# Patient Record
Sex: FEMALE | Race: WHITE | NOT HISPANIC OR LATINO | Employment: UNEMPLOYED | URBAN - METROPOLITAN AREA
[De-identification: names, ages, dates, MRNs, and addresses within clinical notes are randomized per-mention and may not be internally consistent; named-entity substitution may affect disease eponyms.]

---

## 2020-12-10 DIAGNOSIS — Z36.9 ENCOUNTER FOR FETAL ULTRASOUND: Primary | ICD-10-CM

## 2022-12-19 ENCOUNTER — LAB VISIT (OUTPATIENT)
Dept: LAB | Facility: CLINIC | Age: 22
End: 2022-12-19
Payer: OTHER GOVERNMENT

## 2022-12-19 ENCOUNTER — INITIAL PRENATAL (OUTPATIENT)
Dept: OBSTETRICS AND GYNECOLOGY | Facility: CLINIC | Age: 22
End: 2022-12-19
Payer: OTHER GOVERNMENT

## 2022-12-19 VITALS
HEIGHT: 62 IN | HEART RATE: 91 BPM | BODY MASS INDEX: 30.91 KG/M2 | SYSTOLIC BLOOD PRESSURE: 132 MMHG | WEIGHT: 168 LBS | DIASTOLIC BLOOD PRESSURE: 68 MMHG

## 2022-12-19 DIAGNOSIS — Z86.59 HISTORY OF DEPRESSION: ICD-10-CM

## 2022-12-19 DIAGNOSIS — Z3A.10 10 WEEKS GESTATION OF PREGNANCY: ICD-10-CM

## 2022-12-19 DIAGNOSIS — O30.049 DICHORIONIC DIAMNIOTIC TWIN PREGNANCY, ANTEPARTUM: Primary | ICD-10-CM

## 2022-12-19 DIAGNOSIS — O30.049 DICHORIONIC DIAMNIOTIC TWIN PREGNANCY, ANTEPARTUM: ICD-10-CM

## 2022-12-19 LAB
ABO + RH BLD: NORMAL
AMPHET+METHAMPHET UR QL: NEGATIVE
BARBITURATES UR QL SCN>200 NG/ML: NEGATIVE
BASOPHILS # BLD AUTO: 0.06 K/UL (ref 0–0.2)
BASOPHILS NFR BLD: 0.4 % (ref 0–1.9)
BENZODIAZ UR QL SCN>200 NG/ML: NEGATIVE
BLD GP AB SCN CELLS X3 SERPL QL: NORMAL
BZE UR QL SCN: NEGATIVE
CANNABINOIDS UR QL SCN: ABNORMAL
CREAT UR-MCNC: 110.2 MG/DL (ref 15–325)
DIFFERENTIAL METHOD: ABNORMAL
EOSINOPHIL # BLD AUTO: 0.1 K/UL (ref 0–0.5)
EOSINOPHIL NFR BLD: 0.5 % (ref 0–8)
ERYTHROCYTE [DISTWIDTH] IN BLOOD BY AUTOMATED COUNT: 12.1 % (ref 11.5–14.5)
HCT VFR BLD AUTO: 43.3 % (ref 37–48.5)
HGB BLD-MCNC: 14.4 G/DL (ref 12–16)
IMM GRANULOCYTES # BLD AUTO: 0.05 K/UL (ref 0–0.04)
IMM GRANULOCYTES NFR BLD AUTO: 0.4 % (ref 0–0.5)
LYMPHOCYTES # BLD AUTO: 4 K/UL (ref 1–4.8)
LYMPHOCYTES NFR BLD: 28.6 % (ref 18–48)
MCH RBC QN AUTO: 30 PG (ref 27–31)
MCHC RBC AUTO-ENTMCNC: 33.3 G/DL (ref 32–36)
MCV RBC AUTO: 90 FL (ref 82–98)
METHADONE UR QL SCN>300 NG/ML: NEGATIVE
MONOCYTES # BLD AUTO: 0.9 K/UL (ref 0.3–1)
MONOCYTES NFR BLD: 6.1 % (ref 4–15)
NEUTROPHILS # BLD AUTO: 8.9 K/UL (ref 1.8–7.7)
NEUTROPHILS NFR BLD: 64 % (ref 38–73)
NRBC BLD-RTO: 0 /100 WBC
OPIATES UR QL SCN: NEGATIVE
PCP UR QL SCN>25 NG/ML: NEGATIVE
PLATELET # BLD AUTO: 295 K/UL (ref 150–450)
PMV BLD AUTO: 11.9 FL (ref 9.2–12.9)
RBC # BLD AUTO: 4.8 M/UL (ref 4–5.4)
TOXICOLOGY INFORMATION: ABNORMAL
WBC # BLD AUTO: 13.95 K/UL (ref 3.9–12.7)

## 2022-12-19 PROCEDURE — 87491 CHLMYD TRACH DNA AMP PROBE: CPT

## 2022-12-19 PROCEDURE — 87591 N.GONORRHOEAE DNA AMP PROB: CPT

## 2022-12-19 PROCEDURE — 86901 BLOOD TYPING SEROLOGIC RH(D): CPT

## 2022-12-19 PROCEDURE — 36415 PR COLLECTION VENOUS BLOOD,VENIPUNCTURE: ICD-10-PCS | Mod: ,,, | Performed by: STUDENT IN AN ORGANIZED HEALTH CARE EDUCATION/TRAINING PROGRAM

## 2022-12-19 PROCEDURE — 86762 RUBELLA ANTIBODY: CPT

## 2022-12-19 PROCEDURE — 80074 ACUTE HEPATITIS PANEL: CPT

## 2022-12-19 PROCEDURE — 86592 SYPHILIS TEST NON-TREP QUAL: CPT

## 2022-12-19 PROCEDURE — 87389 HIV-1 AG W/HIV-1&-2 AB AG IA: CPT

## 2022-12-19 PROCEDURE — 85660 RBC SICKLE CELL TEST: CPT

## 2022-12-19 PROCEDURE — 80307 DRUG TEST PRSMV CHEM ANLYZR: CPT

## 2022-12-19 PROCEDURE — 87086 URINE CULTURE/COLONY COUNT: CPT

## 2022-12-19 PROCEDURE — 85025 COMPLETE CBC W/AUTO DIFF WBC: CPT

## 2022-12-19 PROCEDURE — 36415 COLL VENOUS BLD VENIPUNCTURE: CPT | Mod: ,,, | Performed by: STUDENT IN AN ORGANIZED HEALTH CARE EDUCATION/TRAINING PROGRAM

## 2022-12-19 RX ORDER — FOLIC ACID 1 MG/1
1 TABLET ORAL DAILY
Qty: 90 TABLET | Refills: 3 | Status: SHIPPED | OUTPATIENT
Start: 2022-12-19 | End: 2023-05-05

## 2022-12-19 RX ORDER — DOXYLAMINE SUCCINATE 25 MG/1
TABLET ORAL
COMMUNITY
Start: 2022-11-28 | End: 2023-01-04 | Stop reason: SDUPTHER

## 2022-12-19 RX ORDER — LANOLIN ALCOHOL/MO/W.PET/CERES
CREAM (GRAM) TOPICAL
COMMUNITY
Start: 2022-11-28 | End: 2023-01-04 | Stop reason: SDUPTHER

## 2022-12-19 RX ORDER — ASPIRIN 81 MG/1
81 TABLET ORAL DAILY
Qty: 90 TABLET | Refills: 3 | Status: SHIPPED | OUTPATIENT
Start: 2022-12-19 | End: 2023-05-05

## 2022-12-19 NOTE — PROGRESS NOTES
"2022  22 y.o. 10w2d per early first trimester dating u/s at [facility]. Estimated Date of Delivery: 7/15/23. Transfer from [facility]; records pending. Dichorionic Diamniotic Twin Pregnancy.  OB History    Para Term  AB Living   3 1 1   1 1   SAB IAB Ectopic Multiple Live Births   1       1      # Outcome Date GA Lbr Bernnen/2nd Weight Sex Delivery Anes PTL Lv   3 Current            2 Term 22 37w5d  3.657 kg (8 lb 1 oz) M Vag-Spont EPI N ROSA MARIA   1 SAB 2022               Here for scheduled Initial OB visit. Referred to us by Dr. Rios at [facility].    Doing well. Mild nausea and vomiting well controlled with vitamin b6 and unisom.  No lof/brvb, dysuria, fever/chills. No S&S of pre eclampsia or COIVD 19. Good FM noted. No cramps/regular contractions. Calm, pleasant, NAD. ROS negative with exception of aforementioned:    Allergies:  Prozac- suicidal ideation    PMH:  Depression- not currently on medication. Pt has good support at home. Denies suicidal or homicidal ideation.     Surghx:  Tonsillectomy  [place] teeth extraction  Cholecystectomy 2021  Stint placement and removal of pancreas    SOCHX:  Pt is happily wed  Denies tobacco, etoh, substance use    OBHX:     x 1 in 2020  Proven to 8#1oz  Di Di Twin Pregnancy  Nausea and Vomiting  Hx of Depression    Review of Systems:  General ROS: negative for headache or visual changes  Breast ROS: negative for breast lumps  Gastrointestinal ROS: negative for constipation, diarrhea or nausea/vomiting  Musculoskeletal ROS: negative for pain in joints or swelling in face or hands.   Neurological ROS: negative for - headaches, numbness/tingling or visual changes      Physical Exam:  /68   Pulse 91   Ht 5' 2" (1.575 m)   Wt 76.2 kg (168 lb)   BMI 30.73 kg/m²   Urine Dip: neg/neg  FHT: Fetal Heart Rate: 170/180 Dop tones obtained and FCA x 2 visualized on V-Scan    Constitutional: She is oriented to person, place, and time. She " appears well-developed and well-nourished. No distress.   Pulmonary/Chest: Effort normal. No respiratory distress  Abdominal: Soft, gravid, nontender. No rebound and no guarding. Fundal Height: Fundal Height (cm): 10 cm S=D  Genitourinary: Deferred   Musculoskeletal: Normal range of motion. Minimal peripheral edema.   Neurological: She is alert and oriented to person, place, and time. Coordination normal. Gait smooth and steady  Skin: Skin is warm and dry. She is not diaphoretic.  Psychiatric: She has a normal mood and affect.      Assessment:   22 y.o., at 10w2d Gestation   Patient Active Problem List   Diagnosis    Dichorionic diamniotic twin pregnancy, antepartum    10 weeks gestation of pregnancy    History of depression     Current Outpatient Medications on File Prior to Visit   Medication Sig Dispense Refill    prenatal vit/iron fum/folic ac (PRENATAL 1+1 ORAL) Take by mouth.      pyridoxine, vitamin B6, (B-6) 50 MG Tab Take by mouth.      UNISOM, DOXYLAMINE, 25 mg tablet Take by mouth.       No current facility-administered medications on file prior to visit.       Plan:  1. Oriented to our collaborative group.  2. S&S of SAB, PTL/PPROM and Pre Eclampsia reinforced.  3. Continue home meds/daily PNV, Folic Acid, Antiemetic.  4. OB panel + NIPT today.  5. Di Di Twins referred to [facility].  6. JEANNIE completed for prenatal records Romelia SANDERS.   7. GIO in 4 weeks.

## 2022-12-20 LAB
C TRACH DNA SPEC QL NAA+PROBE: NOT DETECTED
HAV IGM SERPL QL IA: NORMAL
HBV CORE IGM SERPL QL IA: NORMAL
HBV SURFACE AG SERPL QL IA: NORMAL
HCV AB SERPL QL IA: NORMAL
HGB S BLD QL SOLY: NEGATIVE
HIV 1+2 AB+HIV1 P24 AG SERPL QL IA: NORMAL
N GONORRHOEA DNA SPEC QL NAA+PROBE: NOT DETECTED
RPR SER QL: NORMAL
RUBV IGG SER-ACNC: 30.8 IU/ML
RUBV IGG SER-IMP: REACTIVE

## 2022-12-21 LAB
BACTERIA UR CULT: NORMAL
BACTERIA UR CULT: NORMAL

## 2022-12-22 DIAGNOSIS — Z3A.10 10 WEEKS GESTATION OF PREGNANCY: Primary | ICD-10-CM

## 2022-12-29 ENCOUNTER — PROCEDURE VISIT (OUTPATIENT)
Dept: MATERNAL FETAL MEDICINE | Facility: CLINIC | Age: 22
End: 2022-12-29
Payer: OTHER GOVERNMENT

## 2022-12-29 DIAGNOSIS — Z3A.10 10 WEEKS GESTATION OF PREGNANCY: ICD-10-CM

## 2022-12-29 PROCEDURE — 76801 OB US < 14 WKS SINGLE FETUS: CPT | Mod: S$GLB,,, | Performed by: OBSTETRICS & GYNECOLOGY

## 2022-12-29 PROCEDURE — 76801 US MFM PROCEDURE (VIEWPOINT): ICD-10-PCS | Mod: S$GLB,,, | Performed by: OBSTETRICS & GYNECOLOGY

## 2022-12-29 PROCEDURE — 76802 OB US < 14 WKS ADDL FETUS: CPT | Mod: S$GLB,,, | Performed by: OBSTETRICS & GYNECOLOGY

## 2022-12-29 PROCEDURE — 76802 US MFM PROCEDURE (VIEWPOINT): ICD-10-PCS | Mod: S$GLB,,, | Performed by: OBSTETRICS & GYNECOLOGY

## 2023-01-04 DIAGNOSIS — R11.2 NAUSEA AND VOMITING, UNSPECIFIED VOMITING TYPE: ICD-10-CM

## 2023-01-04 DIAGNOSIS — O30.049 DICHORIONIC DIAMNIOTIC TWIN PREGNANCY, ANTEPARTUM: Primary | ICD-10-CM

## 2023-01-04 RX ORDER — LANOLIN ALCOHOL/MO/W.PET/CERES
50 CREAM (GRAM) TOPICAL DAILY
Qty: 90 TABLET | Refills: 0 | Status: SHIPPED | OUTPATIENT
Start: 2023-01-04 | End: 2023-02-17

## 2023-01-04 RX ORDER — DOXYLAMINE SUCCINATE 25 MG/1
25 TABLET ORAL 2 TIMES DAILY
Qty: 60 TABLET | Refills: 2 | Status: SHIPPED | OUTPATIENT
Start: 2023-01-04 | End: 2023-04-04

## 2023-01-17 ENCOUNTER — ROUTINE PRENATAL (OUTPATIENT)
Dept: OBSTETRICS AND GYNECOLOGY | Facility: CLINIC | Age: 23
End: 2023-01-17
Payer: OTHER GOVERNMENT

## 2023-01-17 VITALS
WEIGHT: 171.13 LBS | SYSTOLIC BLOOD PRESSURE: 106 MMHG | DIASTOLIC BLOOD PRESSURE: 67 MMHG | BODY MASS INDEX: 31.29 KG/M2

## 2023-01-17 DIAGNOSIS — R51.9 GENERALIZED HEADACHES: ICD-10-CM

## 2023-01-17 DIAGNOSIS — F12.10 TETRAHYDROCANNABINOL (THC) USE DISORDER, MILD, ABUSE: ICD-10-CM

## 2023-01-17 DIAGNOSIS — O30.049 DICHORIONIC DIAMNIOTIC TWIN PREGNANCY, ANTEPARTUM: ICD-10-CM

## 2023-01-17 DIAGNOSIS — Z3A.14 14 WEEKS GESTATION OF PREGNANCY: Primary | ICD-10-CM

## 2023-01-17 DIAGNOSIS — Z86.59 HISTORY OF DEPRESSION: ICD-10-CM

## 2023-01-17 DIAGNOSIS — R11.2 NAUSEA AND VOMITING, UNSPECIFIED VOMITING TYPE: ICD-10-CM

## 2023-01-17 PROBLEM — Z3A.10 10 WEEKS GESTATION OF PREGNANCY: Status: RESOLVED | Noted: 2022-12-19 | Resolved: 2023-01-17

## 2023-01-17 NOTE — PROGRESS NOTES
2023  22 y.o. 14w3d per u/s at 12w4d on 2022. Estimated Due Date: 2023. Transfer from [facility]; records recieved. Dichorionic Diamniotic Twin Pregnancy.  OB History    Para Term  AB Living   3 1 1   1 1   SAB IAB Ectopic Multiple Live Births   1       1      # Outcome Date GA Lbr Brennen/2nd Weight Sex Delivery Anes PTL Lv   3 Current            2 Term 22 37w5d  3.657 kg (8 lb 1 oz) M Vag-Spont EPI N ROSA MARIA   1 SAB 2022               Here for scheduled GIO visit. Referred to us by Dr. Rios at [facility].    Doing well. Having occasional headaches, reports they resolve with rest or tylenol.   No lof/brvb, dysuria, fever/chills. No S&S of pre eclampsia or COIVD 19. Good FM noted. No cramps/regular contractions. Calm, pleasant, NAD. ROS negative with exception of aforementioned:    Allergies:  Prozac- suicidal ideation    PMH:  Depression- not currently on medication. Pt has good support at home. Denies suicidal or homicidal ideation.     Surghx:  Tonsillectomy  [place] teeth extraction  Cholecystectomy 2021  Stint placement and removal of pancreas    SOCHX:  Pt is happily wed  Denies tobacco, etoh, substance use    OBHX:     x 1 in 2020  Proven to 8#1oz  Di Di Twin Pregnancy  Nausea and Vomiting  Hx of Depression    LABS:  O POS/ABS Neg  CBC 14.4/43.3/295  Sickle Cell Neg  Rubella Immune  Hep A B C Neg  HIV Neg  RPR Non-Reactive  NIPT pending    UDS +THC  Urine Cx Neg  GC/CHL Neg        Review of Systems:  General ROS: negative for headache or visual changes  Breast ROS: negative for breast lumps  Gastrointestinal ROS: negative for constipation, diarrhea or nausea/vomiting  Musculoskeletal ROS: negative for pain in joints or swelling in face or hands.   Neurological ROS: negative for - headaches, numbness/tingling or visual changes      Physical Exam:  /67   Wt 77.6 kg (171 lb 1.6 oz)   BMI 31.29 kg/m²   Urine Dip: neg/neg  FHT: Fetal Heart Rate: 150/157      Constitutional: She is oriented to person, place, and time. She appears well-developed and well-nourished. No distress.   Pulmonary/Chest: Effort normal. No respiratory distress  Abdominal: Soft, gravid, nontender. No rebound and no guarding. Fundal Height: Fundal Height (cm): 14 cm S=D  Genitourinary: Deferred   Musculoskeletal: Normal range of motion. Minimal peripheral edema.   Neurological: She is alert and oriented to person, place, and time. Coordination normal. Gait smooth and steady  Skin: Skin is warm and dry. She is not diaphoretic.  Psychiatric: She has a normal mood and affect.      Assessment:   22 y.o., at 14w3d Gestation   Patient Active Problem List   Diagnosis    Dichorionic diamniotic twin pregnancy, antepartum    History of depression    14 weeks gestation of pregnancy    Tetrahydrocannabinol (THC) use disorder, mild, abuse     Current Outpatient Medications on File Prior to Visit   Medication Sig Dispense Refill    aspirin (ECOTRIN) 81 MG EC tablet Take 1 tablet (81 mg total) by mouth once daily. Start at 12 weeks gestation 90 tablet 3    folic acid (FOLVITE) 1 MG tablet Take 1 tablet (1 mg total) by mouth once daily. 90 tablet 3    prenatal vit/iron fum/folic ac (PRENATAL 1+1 ORAL) Take by mouth.      pyridoxine, vitamin B6, (B-6) 50 MG Tab Take 1 tablet (50 mg total) by mouth once daily. 90 tablet 0    UNISOM, DOXYLAMINE, 25 mg tablet Take 1 tablet (25 mg total) by mouth 2 (two) times daily. 60 tablet 2     No current facility-administered medications on file prior to visit.       Plan:  Oriented to our collaborative group.  S&S of SAB, PTL/PPROM and Pre Eclampsia reinforced.  Continue home meds/daily PNV, Folic Acid, Antiemetic.  Di Di Twins referred to [facility]. Pt has appt scheduled 02/22/2023.  AFP next visit.  GIO in 4 weeks.

## 2023-01-28 ENCOUNTER — PATIENT MESSAGE (OUTPATIENT)
Dept: OTHER | Facility: OTHER | Age: 23
End: 2023-01-28
Payer: OTHER GOVERNMENT

## 2023-02-04 ENCOUNTER — PATIENT MESSAGE (OUTPATIENT)
Dept: OTHER | Facility: OTHER | Age: 23
End: 2023-02-04
Payer: OTHER GOVERNMENT

## 2023-02-06 ENCOUNTER — NURSE TRIAGE (OUTPATIENT)
Dept: ADMINISTRATIVE | Facility: CLINIC | Age: 23
End: 2023-02-06
Payer: OTHER GOVERNMENT

## 2023-02-06 ENCOUNTER — TELEPHONE (OUTPATIENT)
Dept: OBSTETRICS AND GYNECOLOGY | Facility: CLINIC | Age: 23
End: 2023-02-06
Payer: OTHER GOVERNMENT

## 2023-02-06 NOTE — TELEPHONE ENCOUNTER
----- Message from Jessica Alvarado sent at 2/6/2023  3:05 PM CST -----  Contact: patient  Type: Needs Medical Advice  Who Called:  patient  Best Call Back Number: [phone] (home)   Additional Information: has not felt the babies move since yesterday, has a weird cramping

## 2023-02-06 NOTE — TELEPHONE ENCOUNTER
Pt 18 weeks pregnant with twins. C/o no fetal movement today, abdominal cramping 5-6/10, headache 7-8/10 (unable to check BP) and chest pain with deep breathing. Care advice to go to ED now per protocol. Verbalized understanding. Encounter routed to provider       Reason for Disposition   MODERATE-SEVERE abdominal pain    Additional Information   Negative: Passed out (i.e., fainted, collapsed and was not responding)   Negative: Shock suspected (e.g., cold/pale/clammy skin, too weak to stand, low BP, rapid pulse)   Negative: Difficult to awaken or acting confused (e.g., disoriented, slurred speech)   Negative: Sounds like a life-threatening emergency to the triager    Protocols used: Pregnancy - Abdominal Pain Less Than 20 Weeks EGA-A-OH

## 2023-02-15 ENCOUNTER — OUTSIDE PLACE OF SERVICE (OUTPATIENT)
Dept: OBSTETRICS AND GYNECOLOGY | Facility: CLINIC | Age: 23
End: 2023-02-15
Payer: OTHER GOVERNMENT

## 2023-02-15 PROCEDURE — 99213 OFFICE O/P EST LOW 20 MIN: CPT | Mod: ,,, | Performed by: GENERAL PRACTICE

## 2023-02-15 PROCEDURE — 99213 PR OFFICE/OUTPT VISIT, EST, LEVL III, 20-29 MIN: ICD-10-PCS | Mod: ,,, | Performed by: GENERAL PRACTICE

## 2023-02-17 ENCOUNTER — LAB VISIT (OUTPATIENT)
Dept: LAB | Facility: CLINIC | Age: 23
End: 2023-02-17
Payer: OTHER GOVERNMENT

## 2023-02-17 ENCOUNTER — ROUTINE PRENATAL (OUTPATIENT)
Dept: OBSTETRICS AND GYNECOLOGY | Facility: CLINIC | Age: 23
End: 2023-02-17
Payer: OTHER GOVERNMENT

## 2023-02-17 VITALS
BODY MASS INDEX: 34.68 KG/M2 | WEIGHT: 189.63 LBS | HEART RATE: 87 BPM | SYSTOLIC BLOOD PRESSURE: 113 MMHG | DIASTOLIC BLOOD PRESSURE: 58 MMHG

## 2023-02-17 DIAGNOSIS — O30.049 DICHORIONIC DIAMNIOTIC TWIN PREGNANCY, ANTEPARTUM: ICD-10-CM

## 2023-02-17 DIAGNOSIS — Z3A.18 18 WEEKS GESTATION OF PREGNANCY: ICD-10-CM

## 2023-02-17 DIAGNOSIS — Z86.59 HISTORY OF DEPRESSION: ICD-10-CM

## 2023-02-17 DIAGNOSIS — Z3A.18 18 WEEKS GESTATION OF PREGNANCY: Primary | ICD-10-CM

## 2023-02-17 DIAGNOSIS — F12.90 MARIJUANA USE DURING PREGNANCY: ICD-10-CM

## 2023-02-17 DIAGNOSIS — O99.320 MARIJUANA USE DURING PREGNANCY: ICD-10-CM

## 2023-02-17 PROCEDURE — 36415 COLL VENOUS BLD VENIPUNCTURE: CPT | Mod: ,,, | Performed by: STUDENT IN AN ORGANIZED HEALTH CARE EDUCATION/TRAINING PROGRAM

## 2023-02-17 PROCEDURE — 36415 PR COLLECTION VENOUS BLOOD,VENIPUNCTURE: ICD-10-PCS | Mod: ,,, | Performed by: STUDENT IN AN ORGANIZED HEALTH CARE EDUCATION/TRAINING PROGRAM

## 2023-02-17 PROCEDURE — 82105 ALPHA-FETOPROTEIN SERUM: CPT | Performed by: NURSE PRACTITIONER

## 2023-02-17 NOTE — PROGRESS NOTES
2023  22 y.o. 18w6d Estimated Date of Delivery: 7/15/23, dating reviewed.   OB History    Para Term  AB Living   3 1 1   1 1   SAB IAB Ectopic Multiple Live Births   1       1      # Outcome Date GA Lbr Brennen/2nd Weight Sex Delivery Anes PTL Lv   3 Current            2 Term 22 37w5d  3.657 kg (8 lb 1 oz) M Vag-Spont EPI N ROSAM ARIA   1 SAB 2022             The patient presents with complaints of   Chief Complaint   Patient presents with    Routine Prenatal Visit     18w 6d       Reports: Good fetal movements reported. No Bleeding or contractions .  She is taking prenatal vitamins. Ms. Baez   is adjusting well and has a good support system of family and friends. She is coping with pregnancy and having no difficulty with sleep.    Doing well today.  Has some cramping after 2023; was seen in L&D.      Review of Systems:  General ROS: negative for headache or visual changes  Breast ROS: negative for breast lumps  Gastrointestinal ROS: negative for constipation, diarrhea or nausea/vomiting  Musculoskeletal ROS: negative for pain in joints or swelling in face or hands.   Neurological ROS: negative for - headaches, numbness/tingling or visual changes      Physical Exam:  BP (!) 113/58   Pulse 87   Wt 86 kg (189 lb 9.6 oz)   BMI 34.68 kg/m²   Urine Dip:  Protein negative Glucose negative    Constitutional: She is oriented to person, place, and time. She appears well-developed and well-nourished. No distress.     Pulmonary/Chest: Effort normal. No respiratory distress  Abdominal: Soft, gravid, nontender. No rebound and no guarding.   Genitourinary: Deferred   Musculoskeletal: Normal range of motion, Minimal peripheral edema.   Neurological: She is alert and oriented to person, place, and time. Coordination normal.   Skin: Skin is warm and dry. She is not diaphoretic.  Psychiatric: She has a normal mood and affect.        Assessment:  Overall doing well.   22 y.o., at 18w6d Gestation    Patient Active Problem List   Diagnosis    Dichorionic diamniotic twin pregnancy, antepartum    History of depression    14 weeks gestation of pregnancy    Tetrahydrocannabinol (THC) use disorder, mild, abuse    Marijuana use during pregnancy    18 weeks gestation of pregnancy     Current Outpatient Medications on File Prior to Visit   Medication Sig Dispense Refill    aspirin (ECOTRIN) 81 MG EC tablet Take 1 tablet (81 mg total) by mouth once daily. Start at 12 weeks gestation 90 tablet 3    folic acid (FOLVITE) 1 MG tablet Take 1 tablet (1 mg total) by mouth once daily. 90 tablet 3    prenatal vit/iron fum/folic ac (PRENATAL 1+1 ORAL) Take by mouth.      UNISOM, DOXYLAMINE, 25 mg tablet Take 1 tablet (25 mg total) by mouth 2 (two) times daily. 60 tablet 2    [DISCONTINUED] pyridoxine, vitamin B6, (B-6) 50 MG Tab Take 1 tablet (50 mg total) by mouth once daily. 90 tablet 0     No current facility-administered medications on file prior to visit.       18 weeks gestation of pregnancy    Dichorionic diamniotic twin pregnancy, antepartum    History of depression    Marijuana use during pregnancy         Plan:  Overall doing well    Follow up 3 Weeks, bleeding/pain precautions, kick counts, labor precautions discussed. Anatomy US scheduled for next week.  GIO in 3 weeks with SE.      Mireille Farris, NICHOLAS-C

## 2023-02-20 LAB
# FETUSES US: NORMAL
AFP INTERPRETATION: NORMAL
AFP MOM SERPL: 0.84
AFP SERPL-MCNC: 77.4 NG/ML
AFP SERPL-MCNC: NEGATIVE NG/ML
AGE AT DELIVERY: 22
GA (DAYS): 6 D
GA (WEEKS): 18 WK
GESTATIONAL AGE METHOD: NORMAL
IDDM PATIENT QL: NORMAL
SMOKING STATUS FTND: NO

## 2023-02-22 ENCOUNTER — OFFICE VISIT (OUTPATIENT)
Dept: MATERNAL FETAL MEDICINE | Facility: CLINIC | Age: 23
End: 2023-02-22
Payer: OTHER GOVERNMENT

## 2023-02-22 ENCOUNTER — PROCEDURE VISIT (OUTPATIENT)
Dept: MATERNAL FETAL MEDICINE | Facility: CLINIC | Age: 23
End: 2023-02-22
Payer: OTHER GOVERNMENT

## 2023-02-22 VITALS
DIASTOLIC BLOOD PRESSURE: 66 MMHG | HEIGHT: 62 IN | SYSTOLIC BLOOD PRESSURE: 137 MMHG | WEIGHT: 192.38 LBS | BODY MASS INDEX: 35.4 KG/M2

## 2023-02-22 DIAGNOSIS — O30.049 DICHORIONIC DIAMNIOTIC TWIN PREGNANCY, ANTEPARTUM: ICD-10-CM

## 2023-02-22 DIAGNOSIS — Z36.89 ENCOUNTER FOR ULTRASOUND TO ASSESS FETAL GROWTH: Primary | ICD-10-CM

## 2023-02-22 DIAGNOSIS — Z3A.10 10 WEEKS GESTATION OF PREGNANCY: ICD-10-CM

## 2023-02-22 PROCEDURE — 76811 OB US DETAILED SNGL FETUS: CPT | Mod: S$GLB,,, | Performed by: OBSTETRICS & GYNECOLOGY

## 2023-02-22 PROCEDURE — 76812 OB US DETAILED ADDL FETUS: CPT | Mod: S$GLB,,, | Performed by: OBSTETRICS & GYNECOLOGY

## 2023-02-22 PROCEDURE — 99214 OFFICE O/P EST MOD 30 MIN: CPT | Mod: 25,S$GLB,, | Performed by: OBSTETRICS & GYNECOLOGY

## 2023-02-22 PROCEDURE — 99214 PR OFFICE/OUTPT VISIT, EST, LEVL IV, 30-39 MIN: ICD-10-PCS | Mod: 25,S$GLB,, | Performed by: OBSTETRICS & GYNECOLOGY

## 2023-02-22 PROCEDURE — 76811 PR US, OB FETAL EVAL & EXAM, TRANSABDOM,FIRST GESTATION: ICD-10-PCS | Mod: S$GLB,,, | Performed by: OBSTETRICS & GYNECOLOGY

## 2023-02-22 PROCEDURE — 76812 OB US DETAILED ADDL FETUS: ICD-10-PCS | Mod: S$GLB,,, | Performed by: OBSTETRICS & GYNECOLOGY

## 2023-02-22 RX ORDER — ONDANSETRON 4 MG/1
4 TABLET, ORALLY DISINTEGRATING ORAL EVERY 8 HOURS PRN
COMMUNITY
Start: 2022-05-30

## 2023-02-22 RX ORDER — FAMOTIDINE 20 MG/1
1 TABLET, FILM COATED ORAL NIGHTLY
COMMUNITY
Start: 2022-05-30

## 2023-02-22 NOTE — PROGRESS NOTES
Chief complaint: Twin Gestation    Provider requesting consultation: SOLANGE Mortensen CNM    22 y.o. [id number] 19w4d EGA.    PMH:History reviewed. No pertinent past medical history.    PObHx:  OB History    Para Term  AB Living   3 1 1   1 1   SAB IAB Ectopic Multiple Live Births   1       1      # Outcome Date GA Lbr Brennen/2nd Weight Sex Delivery Anes PTL Lv   3 Current            2 Term 22 37w5d  3.657 kg (8 lb 1 oz) M Vag-Spont EPI N ROSA MARIA   1 SAB 2022               PSH:  Past Surgical History:   Procedure Laterality Date    CHOLECYSTECTOMY  2021    PANCREAS SURGERY      stint placement and removal    TONSILLECTOMY      WISDOM TOOTH EXTRACTION Bilateral        Family history:family history is not on file.    Social history: reports that she has never smoked. She has never been exposed to tobacco smoke. She has never used smokeless tobacco. She reports that she does not currently use alcohol. She reports that she does not use drugs.    A detailed fetal anatomical ultrasound was completed today.  See details in imaging section of EPIC.    Dichorionic-Diamniotic Twins  Today I discussed with the patient the finding of a dichorionic-diamniotic twin pregnancy and the risks associated with this type of pregnancy. I counseled her on the increased incidence of preeclampsia/gestational hypertension, gestational diabetes, fetal growth restriction, anemia, congenital anomalies, need for antepartum hospitalization,  labor/PPROM, stillbirth, and risk of postpartum hemorrhage that can occur in twin pregnancies. We discussed plans for monthly ultrasound surveillance looking for signs of fetal growth restriction.     Recommendations (Please refer to [facility] guidelines):   Detailed anatomy surveys at 19-20 weeks   Transvaginal cervical length screening at time of anatomy scan  Fetal growth ultrasounds every 3-4 weeks starting at 23-24 weeks  Low-dose aspirin daily (81 mg) beginning at 12-16 weeks to  reduce the risk of preeclampsia  Folic acid 1 mg daily and ferrous sulfate 325 mg daily  Increase daily dietary intake by approximately 300 kcal above that for a [place] pregnancy, or 600 kcal over that of a nonpregnant woman and monitor weight gain   testing with weekly NST and MVP assessment beginning at 32 weeks (this is to be ordered by primary OB provider)  Given the increased risks of a twin pregnancy, prenatal visits every 2-3 weeks from 24 - 32/34 weeks and weekly prenatal visits thereafter    Delivery timing:   Normal growth and testin 0/7 - 38 6/7 weeks gestation   Normal growth, comorbid condition: 37 0/7 - 37 6/7 weeks gestation   IUGR of one or both: 36 0/7 - 36 6/7 weeks gestation   IUGR with abnormal UA Doppler, oligohydramnios, or multiple comorbid conditions: 32 0/7 - 34 6/7 weeks gestation    Comorbid conditions include: BMI >= 40, diabetes, hypertension, and complex maternal medical conditions associated with placental dysfunction (Lupus, renal disease, or other vascular disease).    Note is made of a velamentous cord insertion in twin B.      The patient was given an opportunity to ask questions about management and the diease process.  She expressed an understanding of and agreement to the above impression and plan. All questions were answered to her satisfaction.  She was given contact information to the [facility] clinic to address further concerns.

## 2023-02-25 ENCOUNTER — PATIENT MESSAGE (OUTPATIENT)
Dept: OTHER | Facility: OTHER | Age: 23
End: 2023-02-25
Payer: OTHER GOVERNMENT

## 2023-03-01 ENCOUNTER — TELEPHONE (OUTPATIENT)
Dept: OBSTETRICS AND GYNECOLOGY | Facility: CLINIC | Age: 23
End: 2023-03-01
Payer: OTHER GOVERNMENT

## 2023-03-01 PROBLEM — O43.122 VELAMENTOUS INSERTION OF UMBILICAL CORD IN SECOND TRIMESTER: Status: ACTIVE | Noted: 2023-03-01

## 2023-03-01 NOTE — PROGRESS NOTES
I reviewed ultrasound findings with patient via phone call. Reviewed velamentous insertion for Baby B. We discussed need for delivery via  due to velamentous insertion. Pt verbalizes understanding. Reviewed vaginal bleeding precautions, pt denies vaginal bleeding. Reviewed if she is experiences vaginal bleeding then she will need to proceed to ER. Pt verbalizes understanding.

## 2023-03-10 ENCOUNTER — ROUTINE PRENATAL (OUTPATIENT)
Dept: OBSTETRICS AND GYNECOLOGY | Facility: CLINIC | Age: 23
End: 2023-03-10
Payer: OTHER GOVERNMENT

## 2023-03-10 VITALS — DIASTOLIC BLOOD PRESSURE: 69 MMHG | BODY MASS INDEX: 37.59 KG/M2 | SYSTOLIC BLOOD PRESSURE: 119 MMHG | WEIGHT: 205.5 LBS

## 2023-03-10 DIAGNOSIS — F12.90 MARIJUANA USE DURING PREGNANCY: ICD-10-CM

## 2023-03-10 DIAGNOSIS — O43.122 VELAMENTOUS INSERTION OF UMBILICAL CORD IN SECOND TRIMESTER: ICD-10-CM

## 2023-03-10 DIAGNOSIS — Z86.59 HISTORY OF DEPRESSION: ICD-10-CM

## 2023-03-10 DIAGNOSIS — Z3A.21 21 WEEKS GESTATION OF PREGNANCY: Primary | ICD-10-CM

## 2023-03-10 DIAGNOSIS — O99.320 MARIJUANA USE DURING PREGNANCY: ICD-10-CM

## 2023-03-10 DIAGNOSIS — O30.049 DICHORIONIC DIAMNIOTIC TWIN PREGNANCY, ANTEPARTUM: ICD-10-CM

## 2023-03-10 PROBLEM — Z3A.14 14 WEEKS GESTATION OF PREGNANCY: Status: RESOLVED | Noted: 2023-01-17 | Resolved: 2023-03-10

## 2023-03-10 PROBLEM — F12.10 TETRAHYDROCANNABINOL (THC) USE DISORDER, MILD, ABUSE: Status: RESOLVED | Noted: 2023-01-17 | Resolved: 2023-03-10

## 2023-03-10 PROBLEM — Z3A.18 18 WEEKS GESTATION OF PREGNANCY: Status: RESOLVED | Noted: 2023-02-17 | Resolved: 2023-03-10

## 2023-03-10 NOTE — PROGRESS NOTES
3/10/2023  22 y.o. 21w6d per u/s at 12w4d on 2022. Estimated Due Date: 2023. Transfer from [facility]; records recieved. Dichorionic Diamniotic Twin Pregnancy.  OB History    Para Term  AB Living   3 1 1   1 1   SAB IAB Ectopic Multiple Live Births   1       1      # Outcome Date GA Lbr Brennen/2nd Weight Sex Delivery Anes PTL Lv   3 Current            2 Term 22 37w5d  3.657 kg (8 lb 1 oz) M Vag-Spont EPI N ROSA MARIA   1 SAB 2022               Here for scheduled GIO visit. Referred to us by Dr. Rios at [facility].    Doing well. Having occasional headaches, reports they resolve with rest or tylenol.   No lof/brvb, dysuria, fever/chills. No S&S of pre eclampsia or COIVD 19. Good FM noted. No cramps/regular contractions. Calm, pleasant, NAD. ROS negative with exception of aforementioned:    Anatomy scan incomplete. Follow up on 2023. Normal cord insertion Baby A. Velamentous cord insertion Baby B. C/S delivery due to velamentous cord insertion. Transfer of care to Dr. Orta for planned C/S delivery due to velamentous cord insertion.     Allergies:  Prozac- suicidal ideation    PMH:  Depression- not currently on medication. Pt has good support at home. Denies suicidal or homicidal ideation.     Surghx:  Tonsillectomy  [place] teeth extraction  Cholecystectomy 2021  Stint placement and removal of pancreas    SOCHX:  Pt is happily wed  Denies tobacco, etoh, substance use    OBHX:     x 1 in 2020  Proven to 8#1oz  Di Di Twin Pregnancy  Nausea and Vomiting  Hx of Depression  Velamentous Cord Insertion Baby B    LABS:  O POS/ABS Neg  CBC 14.4/43.3/295  Sickle Cell Neg  Rubella Immune  Hep A B C Neg  HIV Neg  RPR Non-Reactive  NIPT Low Risk x 3; Di Di Twins Female/Female Monozygotic  AFP Neg    UDS +THC  Urine Cx Neg  GC/CHL Neg        Review of Systems:  General ROS: negative for headache or visual changes  Breast ROS: negative for breast lumps  Gastrointestinal ROS:  negative for constipation, diarrhea or nausea/vomiting  Musculoskeletal ROS: negative for pain in joints or swelling in face or hands.   Neurological ROS: negative for - headaches, numbness/tingling or visual changes      Physical Exam:  /69   Wt 93.2 kg (205 lb 8 oz)   BMI 37.59 kg/m²   Urine Dip: neg/neg  FHT: Fetal Heart Rate: 150/155     Constitutional: She is oriented to person, place, and time. She appears well-developed and well-nourished. No distress.   Pulmonary/Chest: Effort normal. No respiratory distress  Abdominal: Soft, gravid, nontender. No rebound and no guarding. Fundal Height: Fundal Height (cm): 21 cm S=D  Genitourinary: Deferred   Musculoskeletal: Normal range of motion. Minimal peripheral edema.   Neurological: She is alert and oriented to person, place, and time. Coordination normal. Gait smooth and steady  Skin: Skin is warm and dry. She is not diaphoretic.  Psychiatric: She has a normal mood and affect.      Assessment:   22 y.o., at 21w6d Gestation   Patient Active Problem List   Diagnosis    Dichorionic diamniotic twin pregnancy, antepartum    History of depression    14 weeks gestation of pregnancy    Tetrahydrocannabinol (THC) use disorder, mild, abuse    Marijuana use during pregnancy    18 weeks gestation of pregnancy    Velamentous insertion of umbilical cord in second trimester     Current Outpatient Medications on File Prior to Visit   Medication Sig Dispense Refill    aspirin (ECOTRIN) 81 MG EC tablet Take 1 tablet (81 mg total) by mouth once daily. Start at 12 weeks gestation 90 tablet 3    famotidine (PEPCID) 20 MG tablet Take 1 tablet by mouth every evening.      folic acid (FOLVITE) 1 MG tablet Take 1 tablet (1 mg total) by mouth once daily. 90 tablet 3    ondansetron (ZOFRAN-ODT) 4 MG TbDL Take 4 mg by mouth every 8 (eight) hours as needed.      prenatal vit,calc76-iron-folic 29 mg iron- 1 mg Tab Take 1 tablet by mouth every morning.      prenatal vit/iron  fum/folic ac (PRENATAL 1+1 ORAL) Take by mouth.      UNISOM, DOXYLAMINE, 25 mg tablet Take 1 tablet (25 mg total) by mouth 2 (two) times daily. 60 tablet 2     No current facility-administered medications on file prior to visit.       Plan:  1. Oriented to our collaborative group.  2. S&S of SAB, PTL/PPROM and Pre Eclampsia reinforced.  3. Continue home meds/daily PNV, Folic Acid, Antiemetic.  4. Di Di Twins. Plugged in with MFM. Next appt is 04/12/2023.  5. NIPT Low Risk x 3. Monozygotic Di Di Females. AFP Neg.   6. Velamentous cord Baby B. Transfer of care to Dr. Orta. Pt will deliver via c/s.   7. GIO in 4 weeks.

## 2023-03-15 ENCOUNTER — ROUTINE PRENATAL (OUTPATIENT)
Dept: OBSTETRICS AND GYNECOLOGY | Facility: CLINIC | Age: 23
End: 2023-03-15
Payer: OTHER GOVERNMENT

## 2023-03-15 ENCOUNTER — PATIENT MESSAGE (OUTPATIENT)
Dept: ADMINISTRATIVE | Facility: OTHER | Age: 23
End: 2023-03-15
Payer: OTHER GOVERNMENT

## 2023-03-15 VITALS
DIASTOLIC BLOOD PRESSURE: 61 MMHG | SYSTOLIC BLOOD PRESSURE: 128 MMHG | WEIGHT: 207 LBS | BODY MASS INDEX: 37.86 KG/M2 | HEART RATE: 95 BPM

## 2023-03-15 DIAGNOSIS — Z3A.22 22 WEEKS GESTATION OF PREGNANCY: ICD-10-CM

## 2023-03-15 DIAGNOSIS — N89.8 VAGINAL DISCHARGE: ICD-10-CM

## 2023-03-15 DIAGNOSIS — Z3A.21 21 WEEKS GESTATION OF PREGNANCY: Primary | ICD-10-CM

## 2023-03-15 DIAGNOSIS — O30.049 DICHORIONIC DIAMNIOTIC TWIN PREGNANCY, ANTEPARTUM: ICD-10-CM

## 2023-03-15 DIAGNOSIS — R42 DIZZINESS: ICD-10-CM

## 2023-03-15 LAB — GLUCOSE SERPL-MCNC: 97 MG/DL (ref 70–110)

## 2023-03-15 PROCEDURE — 81514 NFCT DS BV&VAGINITIS DNA ALG: CPT

## 2023-03-15 PROCEDURE — 82962 POCT GLUCOSE, HAND-HELD DEVICE: ICD-10-PCS | Mod: ,,,

## 2023-03-15 PROCEDURE — 82962 GLUCOSE BLOOD TEST: CPT | Mod: ,,,

## 2023-03-15 NOTE — PROGRESS NOTES
3/15/2023  22 y.o. 21w6d per u/s at 12w4d on 2022. Estimated Due Date: 2023. Transfer from [facility]; records recieved. Dichorionic Diamniotic Twin Pregnancy.  OB History    Para Term  AB Living   3 1 1   1 1   SAB IAB Ectopic Multiple Live Births   1       1      # Outcome Date GA Lbr Brennen/2nd Weight Sex Delivery Anes PTL Lv   3 Current            2 Term 22 37w5d  3.657 kg (8 lb 1 oz) M Vag-Spont EPI N ROSA MARIA   1 SAB 2022               Here for scheduled GIO visit. Referred to us by Dr. Rios at [facility].    Pt is reporting dizzy spells x 1 week. Pt reports episodes resolve with rest. No exacerbating factors. Pt normotensive in office today. Non-fasting BG taken 1.5 hours after patient ate ice cream BG is 97. We reivewed if patient having dizzy spells to sit down, rest, have glass of water and small snack. If no improvement consider cardiology consult. Pt agreeable to plan of care.     Increased vaginal discharge. NO VB or leaking of fluid. NO CTX. + Fetal movement. Vaginosis swab collected.     Doing well. Having occasional headaches, reports they resolve with rest or tylenol.   No lof/brvb, dysuria, fever/chills. No S&S of pre eclampsia or COIVD 19. Good FM noted. No cramps/regular contractions. Calm, pleasant, NAD. ROS negative with exception of aforementioned:    Anatomy scan incomplete. Follow up on 2023. Normal cord insertion Baby A. Velamentous cord insertion Baby B. C/S delivery due to velamentous cord insertion. Transfer of care to Dr. Orta for planned C/S delivery due to velamentous cord insertion.     Allergies:  Prozac- suicidal ideation    PMH:  Depression- not currently on medication. Pt has good support at home. Denies suicidal or homicidal ideation.     Surghx:  Tonsillectomy  [place] teeth extraction  Cholecystectomy 2021  Stint placement and removal of pancreas    SOCHX:  Pt is happily wed  Denies tobacco, etoh, substance use    OBHX:     x 1  in 12/2020  Proven to 8#1oz  Di Di Twin Pregnancy  Nausea and Vomiting  Hx of Depression  Velamentous Cord Insertion Baby B    LABS:  O POS/ABS Neg  CBC 14.4/43.3/295  Sickle Cell Neg  Rubella Immune  Hep A B C Neg  HIV Neg  RPR Non-Reactive  NIPT Low Risk x 3; Di Di Twins Female/Female Monozygotic  AFP Neg    UDS +THC  Urine Cx Neg  GC/CHL Neg    GTT/CBC in 4 weeks.         Review of Systems:  General ROS: negative for headache or visual changes  Breast ROS: negative for breast lumps  Gastrointestinal ROS: negative for constipation, diarrhea or nausea/vomiting  Musculoskeletal ROS: negative for pain in joints or swelling in face or hands.   Neurological ROS: negative for - headaches, numbness/tingling or visual changes      Physical Exam:  /61   Pulse 95   Wt 93.9 kg (207 lb)   BMI 37.86 kg/m²   Urine Dip: neg/neg  FHT: Fetal Heart Rate: 155/147     Constitutional: She is oriented to person, place, and time. She appears well-developed and well-nourished. No distress.   Pulmonary/Chest: Effort normal. No respiratory distress  Abdominal: Soft, gravid, nontender. No rebound and no guarding. Fundal Height: Fundal Height (cm): 25 cm S=D  Genitourinary: Deferred   Musculoskeletal: Normal range of motion. Minimal peripheral edema.   Neurological: She is alert and oriented to person, place, and time. Coordination normal. Gait smooth and steady  Skin: Skin is warm and dry. She is not diaphoretic.  Psychiatric: She has a normal mood and affect.      Assessment:   22 y.o., at 22w4d Gestation   Patient Active Problem List   Diagnosis    Dichorionic diamniotic twin pregnancy, antepartum    History of depression    Marijuana use during pregnancy    Velamentous insertion of umbilical cord in second trimester    21 weeks gestation of pregnancy     Current Outpatient Medications on File Prior to Visit   Medication Sig Dispense Refill    aspirin (ECOTRIN) 81 MG EC tablet Take 1 tablet (81 mg total) by mouth once daily.  Start at 12 weeks gestation 90 tablet 3    folic acid (FOLVITE) 1 MG tablet Take 1 tablet (1 mg total) by mouth once daily. 90 tablet 3    prenatal vit,calc76-iron-folic 29 mg iron- 1 mg Tab Take 1 tablet by mouth every morning.      famotidine (PEPCID) 20 MG tablet Take 1 tablet by mouth every evening.      ondansetron (ZOFRAN-ODT) 4 MG TbDL Take 4 mg by mouth every 8 (eight) hours as needed.      prenatal vit/iron fum/folic ac (PRENATAL 1+1 ORAL) Take by mouth.      UNISOM, DOXYLAMINE, 25 mg tablet Take 1 tablet (25 mg total) by mouth 2 (two) times daily. (Patient not taking: Reported on 3/15/2023) 60 tablet 2     No current facility-administered medications on file prior to visit.       Plan:  Oriented to our collaborative group.  S&S of SAB, PTL/PPROM and Pre Eclampsia reinforced.  Continue home meds/daily PNV, Folic Acid, Antiemetic.  Di Di Twins. Plugged in with MFM. Next appt is 04/12/2023.  NIPT Low Risk x 3. Monozygotic Di Di Females. AFP Neg.   Velamentous cord Baby B. Transfer of care to Dr. Orta. Pt will deliver via c/s.   GIO in 4 weeks.

## 2023-03-18 LAB
BACTERIAL VAGINOSIS DNA: NEGATIVE
CANDIDA GLABRATA DNA: NEGATIVE
CANDIDA KRUSEI DNA: NEGATIVE
CANDIDA RRNA VAG QL PROBE: NEGATIVE
T VAGINALIS RRNA GENITAL QL PROBE: NEGATIVE

## 2023-03-25 ENCOUNTER — PATIENT MESSAGE (OUTPATIENT)
Dept: OTHER | Facility: OTHER | Age: 23
End: 2023-03-25
Payer: OTHER GOVERNMENT

## 2023-03-29 ENCOUNTER — TELEPHONE (OUTPATIENT)
Dept: OBSTETRICS AND GYNECOLOGY | Facility: CLINIC | Age: 23
End: 2023-03-29
Payer: OTHER GOVERNMENT

## 2023-03-29 NOTE — TELEPHONE ENCOUNTER
Pt called, reports headache with spotty vision and unable to check BP at this time. Pt also reports cramping that feels like contractions. Pt reports fetal movement from baby A, but not baby B due to her anterior placenta. Pt advised per Fatmata Mortensen to report to [facility] for evaluation. Pt STU.

## 2023-04-03 ENCOUNTER — LAB VISIT (OUTPATIENT)
Dept: LAB | Facility: CLINIC | Age: 23
End: 2023-04-03
Payer: OTHER GOVERNMENT

## 2023-04-03 DIAGNOSIS — D50.8 OTHER IRON DEFICIENCY ANEMIA: Primary | ICD-10-CM

## 2023-04-03 DIAGNOSIS — Z3A.22 22 WEEKS GESTATION OF PREGNANCY: ICD-10-CM

## 2023-04-03 LAB
BASOPHILS # BLD AUTO: 0.04 K/UL (ref 0–0.2)
BASOPHILS NFR BLD: 0.3 % (ref 0–1.9)
DIFFERENTIAL METHOD: ABNORMAL
EOSINOPHIL # BLD AUTO: 0.1 K/UL (ref 0–0.5)
EOSINOPHIL NFR BLD: 0.4 % (ref 0–8)
ERYTHROCYTE [DISTWIDTH] IN BLOOD BY AUTOMATED COUNT: 11.9 % (ref 11.5–14.5)
GLUCOSE SERPL-MCNC: 90 MG/DL (ref 70–140)
HCT VFR BLD AUTO: 33.7 % (ref 37–48.5)
HGB BLD-MCNC: 10.6 G/DL (ref 12–16)
IMM GRANULOCYTES # BLD AUTO: 0.11 K/UL (ref 0–0.04)
IMM GRANULOCYTES NFR BLD AUTO: 0.8 % (ref 0–0.5)
LYMPHOCYTES # BLD AUTO: 2.2 K/UL (ref 1–4.8)
LYMPHOCYTES NFR BLD: 15.7 % (ref 18–48)
MCH RBC QN AUTO: 29.5 PG (ref 27–31)
MCHC RBC AUTO-ENTMCNC: 31.5 G/DL (ref 32–36)
MCV RBC AUTO: 94 FL (ref 82–98)
MONOCYTES # BLD AUTO: 0.7 K/UL (ref 0.3–1)
MONOCYTES NFR BLD: 5.3 % (ref 4–15)
NEUTROPHILS # BLD AUTO: 10.6 K/UL (ref 1.8–7.7)
NEUTROPHILS NFR BLD: 77.5 % (ref 38–73)
NRBC BLD-RTO: 0 /100 WBC
PLATELET # BLD AUTO: 308 K/UL (ref 150–450)
PMV BLD AUTO: 11.5 FL (ref 9.2–12.9)
RBC # BLD AUTO: 3.59 M/UL (ref 4–5.4)
WBC # BLD AUTO: 13.71 K/UL (ref 3.9–12.7)

## 2023-04-03 PROCEDURE — 85025 COMPLETE CBC W/AUTO DIFF WBC: CPT

## 2023-04-03 PROCEDURE — 36415 COLL VENOUS BLD VENIPUNCTURE: CPT | Mod: ,,, | Performed by: STUDENT IN AN ORGANIZED HEALTH CARE EDUCATION/TRAINING PROGRAM

## 2023-04-03 PROCEDURE — 36415 PR COLLECTION VENOUS BLOOD,VENIPUNCTURE: ICD-10-PCS | Mod: ,,, | Performed by: STUDENT IN AN ORGANIZED HEALTH CARE EDUCATION/TRAINING PROGRAM

## 2023-04-03 PROCEDURE — 82950 GLUCOSE TEST: CPT

## 2023-04-03 RX ORDER — FERROUS SULFATE 325(65) MG
325 TABLET, DELAYED RELEASE (ENTERIC COATED) ORAL DAILY
Qty: 30 TABLET | Refills: 11 | Status: SHIPPED | OUTPATIENT
Start: 2023-04-03 | End: 2024-04-02

## 2023-04-04 ENCOUNTER — ROUTINE PRENATAL (OUTPATIENT)
Dept: OBSTETRICS AND GYNECOLOGY | Facility: CLINIC | Age: 23
End: 2023-04-04
Payer: OTHER GOVERNMENT

## 2023-04-04 VITALS
WEIGHT: 214.19 LBS | DIASTOLIC BLOOD PRESSURE: 67 MMHG | SYSTOLIC BLOOD PRESSURE: 112 MMHG | BODY MASS INDEX: 39.18 KG/M2

## 2023-04-04 DIAGNOSIS — O43.122 VELAMENTOUS INSERTION OF UMBILICAL CORD IN SECOND TRIMESTER: ICD-10-CM

## 2023-04-04 DIAGNOSIS — Z3A.25 25 WEEKS GESTATION OF PREGNANCY: Primary | ICD-10-CM

## 2023-04-04 DIAGNOSIS — O30.049 DICHORIONIC DIAMNIOTIC TWIN PREGNANCY, ANTEPARTUM: ICD-10-CM

## 2023-04-04 NOTE — PROGRESS NOTES
2023  Chief Complaint   Patient presents with    Routine Prenatal Visit     Pt is here for a 25 wk OB visit     22 y.o.  at 25w3d  Estimated Date of Delivery: 7/15/2023, by Other Basis, dating reviewed.      Patient reports: Good fetal movements reported. No Bleeding or contractions . She is doing well.  She is taking prenatal vitamins. Ms. Baez   is adjusting well and has a good support system of family and friends. She is coping with pregnancy and having no difficulty with sleep.    Prenatal labs reviewed and updated today    OB History    Para Term  AB Living   3 1 1   1 1   SAB IAB Ectopic Multiple Live Births   1       1      # Outcome Date GA Lbr Brennen/2nd Weight Sex Delivery Anes PTL Lv   3 Current            2 Term 22 37w5d  3.657 kg (8 lb 1 oz) M Vag-Spont EPI N ROSA MARIA   1 SAB 2022               Review of Systems:  General ROS: negative for headache or visual changes  Breast ROS: negative for breast lumps  Gastrointestinal ROS: negative for constipation, diarrhea or nausea/vomiting  Musculoskeletal ROS: negative for pain in joints or swelling in face or hands.   Neurological ROS: negative for - headaches, numbness/tingling or visual changes      Physical Exam:  /67   Wt 97.2 kg (214 lb 3.2 oz)   BMI 39.18 kg/m²     Constitutional: She is oriented to person, place, and time. She appears well-developed and well-nourished. No distress.     Pulmonary/Chest: Effort normal. No respiratory distress  Abdominal: Soft, gravid, nontender. No rebound and no guarding.   Genitourinary: Deferred   Musculoskeletal: Normal range of motion, Minimal peripheral edema.   Neurological: She is alert and oriented to person, place, and time. Coordination normal.   Skin: Skin is warm and dry. She is not diaphoretic.  Psychiatric: She has a normal mood and affect.      Assessment:  Overall doing well.   22 y.o.at 25w3d   Patient Active Problem List   Diagnosis    Dichorionic diamniotic  twin pregnancy, antepartum    History of depression    Marijuana use during pregnancy    Velamentous insertion of umbilical cord in second trimester    Dizziness    22 weeks gestation of pregnancy     Current Outpatient Medications on File Prior to Visit   Medication Sig Dispense Refill    aspirin (ECOTRIN) 81 MG EC tablet Take 1 tablet (81 mg total) by mouth once daily. Start at 12 weeks gestation 90 tablet 3    famotidine (PEPCID) 20 MG tablet Take 1 tablet by mouth every evening.      ferrous sulfate 325 (65 FE) MG EC tablet Take 1 tablet (325 mg total) by mouth once daily. 30 tablet 11    folic acid (FOLVITE) 1 MG tablet Take 1 tablet (1 mg total) by mouth once daily. 90 tablet 3    ondansetron (ZOFRAN-ODT) 4 MG TbDL Take 4 mg by mouth every 8 (eight) hours as needed.      prenatal vit,calc76-iron-folic 29 mg iron- 1 mg Tab Take 1 tablet by mouth every morning.      prenatal vit/iron fum/folic ac (PRENATAL 1+1 ORAL) Take by mouth.      UNISOM, DOXYLAMINE, 25 mg tablet Take 1 tablet (25 mg total) by mouth 2 (two) times daily. (Patient not taking: Reported on 3/15/2023) 60 tablet 2     No current facility-administered medications on file prior to visit.     25 weeks gestation of pregnancy    Dichorionic diamniotic twin pregnancy, antepartum    Velamentous insertion of umbilical cord in second trimester       Plan:  Overall doing well  Follow up 2 Weeks, bleeding/pain precautions, kick counts, labor precautions discussed.   1 hour glucose challenge completed today and patient passed.   labor precautions.  Return in 2-3 weeks.  Follow-up with M as scheduled.

## 2023-04-08 ENCOUNTER — PATIENT MESSAGE (OUTPATIENT)
Dept: OTHER | Facility: OTHER | Age: 23
End: 2023-04-08
Payer: OTHER GOVERNMENT

## 2023-04-12 ENCOUNTER — OFFICE VISIT (OUTPATIENT)
Dept: MATERNAL FETAL MEDICINE | Facility: CLINIC | Age: 23
End: 2023-04-12
Payer: OTHER GOVERNMENT

## 2023-04-12 ENCOUNTER — PROCEDURE VISIT (OUTPATIENT)
Dept: MATERNAL FETAL MEDICINE | Facility: CLINIC | Age: 23
End: 2023-04-12
Payer: OTHER GOVERNMENT

## 2023-04-12 VITALS
WEIGHT: 218.56 LBS | DIASTOLIC BLOOD PRESSURE: 66 MMHG | BODY MASS INDEX: 39.98 KG/M2 | SYSTOLIC BLOOD PRESSURE: 125 MMHG

## 2023-04-12 DIAGNOSIS — Z36.89 ENCOUNTER FOR ULTRASOUND TO ASSESS FETAL GROWTH: ICD-10-CM

## 2023-04-12 DIAGNOSIS — Z36.89 ENCOUNTER FOR ULTRASOUND TO ASSESS FETAL GROWTH: Primary | ICD-10-CM

## 2023-04-12 PROCEDURE — 76816 PR  US,PREGNANT UTERUS,F/U,TRANSABD APP: ICD-10-PCS | Mod: 59,S$GLB,, | Performed by: OBSTETRICS & GYNECOLOGY

## 2023-04-12 PROCEDURE — 99499 NO LOS: ICD-10-PCS | Mod: S$GLB,,, | Performed by: OBSTETRICS & GYNECOLOGY

## 2023-04-12 PROCEDURE — 99499 UNLISTED E&M SERVICE: CPT | Mod: S$GLB,,, | Performed by: OBSTETRICS & GYNECOLOGY

## 2023-04-12 PROCEDURE — 76816 OB US FOLLOW-UP PER FETUS: CPT | Mod: 59,S$GLB,, | Performed by: OBSTETRICS & GYNECOLOGY

## 2023-04-22 ENCOUNTER — PATIENT MESSAGE (OUTPATIENT)
Dept: OTHER | Facility: OTHER | Age: 23
End: 2023-04-22
Payer: OTHER GOVERNMENT

## 2023-04-25 ENCOUNTER — TELEPHONE (OUTPATIENT)
Dept: OBSTETRICS AND GYNECOLOGY | Facility: CLINIC | Age: 23
End: 2023-04-25

## 2023-04-25 ENCOUNTER — NURSE TRIAGE (OUTPATIENT)
Dept: ADMINISTRATIVE | Facility: CLINIC | Age: 23
End: 2023-04-25
Payer: OTHER GOVERNMENT

## 2023-04-25 NOTE — TELEPHONE ENCOUNTER
Patient is 29 weeks pregnant with twins. Reports decreased fetal movement of only one of the twins since yesterday morning. Advised per protocol to go to the office now with OBGYN approval or to L&D. Patient would rather be seen in the clinic if possible. Also states that she has a 3:30 pm appointment with Dr. Orta today. Patient wants to know if it's okay to wait to be seen during her scheduled appointment time. Will route message to her provider and staff to f/u with the patient.      Best contact number is [phone]        Reason for Disposition   Pregnant 23 or more weeks with no movement of baby > 8 hours    Additional Information   Negative: Sounds like a life-threatening emergency to the triager   Negative: Blurred vision or visual change   Negative: SEVERE headache and not relieved with acetaminophen (e.g., Tylenol)   Negative: Leakage of fluid from vagina   Negative: Pregnant 23 or more weeks and baby moving less today by kick count (e.g., kick count < 5 in 1 hour or < 10 in 2 hours)   Negative: Pregnant 23 or more weeks and baby moving less today AND unable (or unwilling) to perform kick count   Negative: Pregnant 23 or more weeks with normal kick count BUT mother still thinks there is something wrong    Protocols used: Pregnancy - Decreased Fetal Movement-A-OH

## 2023-04-25 NOTE — TELEPHONE ENCOUNTER
Pt left a message with Call Center stating she has not felt Baby B move more than 24 hours. Pt was told to go to L&D to be seen . Pt advised FULL understanding and appt. was rescheduled.See allergies. CB#[phone]

## 2023-04-26 ENCOUNTER — TELEPHONE (OUTPATIENT)
Dept: OBSTETRICS AND GYNECOLOGY | Facility: CLINIC | Age: 23
End: 2023-04-26
Payer: OTHER GOVERNMENT

## 2023-04-27 ENCOUNTER — ROUTINE PRENATAL (OUTPATIENT)
Dept: OBSTETRICS AND GYNECOLOGY | Facility: CLINIC | Age: 23
End: 2023-04-27
Payer: OTHER GOVERNMENT

## 2023-04-27 VITALS — WEIGHT: 224 LBS | DIASTOLIC BLOOD PRESSURE: 58 MMHG | SYSTOLIC BLOOD PRESSURE: 127 MMHG | BODY MASS INDEX: 40.97 KG/M2

## 2023-04-27 DIAGNOSIS — Z3A.28 28 WEEKS GESTATION OF PREGNANCY: ICD-10-CM

## 2023-04-27 DIAGNOSIS — O60.03 PRETERM LABOR IN THIRD TRIMESTER WITHOUT DELIVERY: ICD-10-CM

## 2023-04-27 DIAGNOSIS — O99.320 MARIJUANA USE DURING PREGNANCY: ICD-10-CM

## 2023-04-27 DIAGNOSIS — O09.93 HIGH-RISK PREGNANCY IN THIRD TRIMESTER: ICD-10-CM

## 2023-04-27 DIAGNOSIS — F12.90 MARIJUANA USE DURING PREGNANCY: ICD-10-CM

## 2023-04-27 DIAGNOSIS — Z86.59 HISTORY OF DEPRESSION: ICD-10-CM

## 2023-04-27 DIAGNOSIS — O30.049 DICHORIONIC DIAMNIOTIC TWIN PREGNANCY, ANTEPARTUM: ICD-10-CM

## 2023-04-27 DIAGNOSIS — O43.122 VELAMENTOUS INSERTION OF UMBILICAL CORD IN SECOND TRIMESTER: ICD-10-CM

## 2023-04-27 DIAGNOSIS — M62.838 MUSCLE SPASM: Primary | ICD-10-CM

## 2023-04-27 PROBLEM — R42 DIZZINESS: Status: RESOLVED | Noted: 2023-03-15 | Resolved: 2023-04-27

## 2023-04-27 PROBLEM — Z3A.22 22 WEEKS GESTATION OF PREGNANCY: Status: RESOLVED | Noted: 2023-03-15 | Resolved: 2023-04-27

## 2023-04-27 PROCEDURE — 96372 THER/PROPH/DIAG INJ SC/IM: CPT | Mod: S$GLB,,, | Performed by: OBSTETRICS & GYNECOLOGY

## 2023-04-27 PROCEDURE — 96372 PR INJECTION,THERAP/PROPH/DIAG2ST, IM OR SUBCUT: ICD-10-PCS | Mod: S$GLB,,, | Performed by: OBSTETRICS & GYNECOLOGY

## 2023-04-27 RX ORDER — BETAMETHASONE SODIUM PHOSPHATE AND BETAMETHASONE ACETATE 3; 3 MG/ML; MG/ML
12 INJECTION, SUSPENSION INTRA-ARTICULAR; INTRALESIONAL; INTRAMUSCULAR; SOFT TISSUE ONCE
Status: DISCONTINUED | OUTPATIENT
Start: 2023-04-27 | End: 2023-04-27

## 2023-04-27 RX ORDER — CYCLOBENZAPRINE HCL 10 MG
10 TABLET ORAL 3 TIMES DAILY PRN
Qty: 30 TABLET | Refills: 2 | Status: SHIPPED | OUTPATIENT
Start: 2023-04-27 | End: 2023-05-27

## 2023-04-27 RX ADMIN — BETAMETHASONE SODIUM PHOSPHATE AND BETAMETHASONE ACETATE 12 MG: 3; 3 INJECTION, SUSPENSION INTRA-ARTICULAR; INTRALESIONAL; INTRAMUSCULAR; SOFT TISSUE at 10:04

## 2023-04-27 NOTE — PROGRESS NOTES
2023  22 y.o. 28w5d per u/s at 12w4d on 2022. Estimated Due Date: 2023. Transfer from [facility]; records recieved. Dichorionic Diamniotic Twin Pregnancy.  OB History    Para Term  AB Living   3 1 1   1 1   SAB IAB Ectopic Multiple Live Births   1       1      # Outcome Date GA Lbr Brennen/2nd Weight Sex Delivery Anes PTL Lv   3 Current            2 Term 22 37w5d  3.657 kg (8 lb 1 oz) M Vag-Spont EPI N ROSA MARIA   1 SAB 2022               Here for scheduled GIO visit. Referred to us by Dr. Rios at [facility].  Patient reports contractions and back pain. Endorses + FM. No LOF or BRVB. Patient was evaluated by [facility] labor and delivery on 2023 patient states at that time they told her she had a short cervix and was 1 cm dilated. No MgSO4 or steroids at that time. Since then patient has been feeling worse. VE today is 3/50/-3 with bulging bag. I reviewed with patient given her progression in cervical change, the fact that she is pregnant with twins which increases her risk for  delivery that she will need to be admitted to Labor and Delivery for Mag and Steroids. We discussed she will likely be in the hospital until delivery. Patient verbalizes understanding. Dr. Falcon is MD on call. He was notified and agrees with plan of care. [facility] labor and delivery notified and report given. Patient given 12 mg of celestone IM and instructed to go straight to labor and delivery. She verbalizes understanding of instructions.     Follow up anatomy and growth on 2023 at 27w3d. Baby A normal anatomy with suboptimal sacral spine views. Breech lower left quandrant. EFW 2#5oz 44%.   Normal but supoptimal anatomy on Baby B. Cephalic right upper quandrant. Velamentous cord insertion. EFW 2#5oz 41%. 1.8 % EFW disconcordance. Di Di gestation.       Anatomy scan incomplete. Follow up on 2023. Normal cord insertion Baby A. Velamentous cord insertion Baby B. C/S delivery due to velamentous  cord insertion. Transfer of care to Dr. Orta for planned C/S delivery due to velamentous cord insertion.     Allergies:  Prozac- suicidal ideation    PMH:  Depression- not currently on medication. Pt has good support at home. Denies suicidal or homicidal ideation.     Surghx:  Tonsillectomy  [place] teeth extraction  Cholecystectomy 2021  Stint placement and removal of pancreas    SOCHX:  Pt is happily wed  Denies tobacco, etoh, substance use    OBHX:     x 1 in 2020  Proven to 8#1oz  Di Di Twin Pregnancy  Nausea and Vomiting  Hx of Depression  Velamentous Cord Insertion Baby B    LABS:  O POS/ABS Neg  CBC 14.4/43.3/295  Sickle Cell Neg  Rubella Immune  Hep A B C Neg  HIV Neg  RPR Non-Reactive  NIPT Low Risk x 3; Di Di Twins Female/Female Monozygotic  AFP Neg    UDS +THC  Urine Cx Neg  GC/CHL Neg    GTT 90  CBC 10.6/33.7/308        Review of Systems:  General ROS: negative for headache or visual changes  Breast ROS: negative for breast lumps  Gastrointestinal ROS: negative for constipation, diarrhea or nausea/vomiting  Musculoskeletal ROS: negative for pain in joints or swelling in face or hands.   Neurological ROS: negative for - headaches, numbness/tingling or visual changes      Physical Exam:  BP (!) 127/58   Wt 101.6 kg (224 lb)   BMI 40.97 kg/m²   Urine Dip: neg/neg  FHT: Fetal Heart Rate: 152/145     Constitutional: She is oriented to person, place, and time. She appears well-developed and well-nourished. No distress.   Pulmonary/Chest: Effort normal. No respiratory distress  Abdominal: Soft, gravid, nontender. No rebound and no guarding. Fundal Height: Fundal Height (cm): 32 cm Size appropriate for twin gestation.  Genitourinary: Deferred   Musculoskeletal: Normal range of motion. Minimal peripheral edema.   Neurological: She is alert and oriented to person, place, and time. Coordination normal. Gait smooth and steady  Skin: Skin is warm and dry. She is not diaphoretic.  Psychiatric: She  has a normal mood and affect.      Assessment:   22 y.o., at 28w5d Gestation   Patient Active Problem List   Diagnosis    Dichorionic diamniotic twin pregnancy, antepartum    History of depression    Marijuana use during pregnancy    Velamentous insertion of umbilical cord in second trimester    28 weeks gestation of pregnancy     labor in third trimester without delivery    High-risk pregnancy in third trimester     Current Outpatient Medications on File Prior to Visit   Medication Sig Dispense Refill    aspirin (ECOTRIN) 81 MG EC tablet Take 1 tablet (81 mg total) by mouth once daily. Start at 12 weeks gestation 90 tablet 3    ferrous sulfate 325 (65 FE) MG EC tablet Take 1 tablet (325 mg total) by mouth once daily. 30 tablet 11    folic acid (FOLVITE) 1 MG tablet Take 1 tablet (1 mg total) by mouth once daily. 90 tablet 3    prenatal vit,calc76-iron-folic 29 mg iron- 1 mg Tab Take 1 tablet by mouth every morning.      famotidine (PEPCID) 20 MG tablet Take 1 tablet by mouth every evening.      ondansetron (ZOFRAN-ODT) 4 MG TbDL Take 4 mg by mouth every 8 (eight) hours as needed.      prenatal vit/iron fum/folic ac (PRENATAL 1+1 ORAL) Take by mouth.       No current facility-administered medications on file prior to visit.       Plan:  Oriented to our collaborative group.  S&S of  PTL/PPROM and Pre Eclampsia reinforced.  Continue home meds/daily PNV, Folic Acid, Antiemetic.  Di Di Twins. Velamentous cord Baby B. EFW on 2023 for both babies was 2#5oz 44% and 41%. Normal interval growth.   NIPT Low Risk x 3. Monozygotic Di Di Females. AFP Neg.   Velamentous cord Baby B. Care transferred to Dr. Orta. Plan primary c/s for delivery.   Normal GTT and CBC.    labor. 3/50/-3 at 28w5d. Pt given celestone and sent to [facility] for MgSO4. Dr. Falcon notified. See HPI.   GIO if discharged from L&D.

## 2023-04-28 ENCOUNTER — OUTSIDE PLACE OF SERVICE (OUTPATIENT)
Dept: OBSTETRICS AND GYNECOLOGY | Facility: CLINIC | Age: 23
End: 2023-04-28
Payer: OTHER GOVERNMENT

## 2023-04-28 PROCEDURE — 59510 CESAREAN DELIVERY: CPT | Mod: 22,,, | Performed by: STUDENT IN AN ORGANIZED HEALTH CARE EDUCATION/TRAINING PROGRAM

## 2023-04-28 PROCEDURE — 59510 PR FULL ROUT OBSTE CARE,CESAREAN DELIV: ICD-10-PCS | Mod: 22,,, | Performed by: STUDENT IN AN ORGANIZED HEALTH CARE EDUCATION/TRAINING PROGRAM

## 2023-05-02 ENCOUNTER — OFFICE VISIT (OUTPATIENT)
Dept: OBSTETRICS AND GYNECOLOGY | Facility: CLINIC | Age: 23
End: 2023-05-02
Payer: OTHER GOVERNMENT

## 2023-05-02 VITALS
HEIGHT: 62 IN | SYSTOLIC BLOOD PRESSURE: 138 MMHG | WEIGHT: 213 LBS | DIASTOLIC BLOOD PRESSURE: 64 MMHG | HEART RATE: 83 BPM | BODY MASS INDEX: 39.2 KG/M2

## 2023-05-02 DIAGNOSIS — Z91.89 AT RISK FOR BREASTFEEDING DIFFICULTY: ICD-10-CM

## 2023-05-02 DIAGNOSIS — R10.9 ABDOMINAL CRAMPING: ICD-10-CM

## 2023-05-02 PROBLEM — O30.009 TWIN DELIVERY BY C-SECTION: Status: ACTIVE | Noted: 2023-05-02

## 2023-05-02 PROBLEM — F12.90 MARIJUANA USE DURING PREGNANCY: Status: RESOLVED | Noted: 2023-02-17 | Resolved: 2023-05-02

## 2023-05-02 PROBLEM — O09.93 HIGH-RISK PREGNANCY IN THIRD TRIMESTER: Status: RESOLVED | Noted: 2023-04-27 | Resolved: 2023-05-02

## 2023-05-02 PROBLEM — O43.122 VELAMENTOUS INSERTION OF UMBILICAL CORD IN SECOND TRIMESTER: Status: RESOLVED | Noted: 2023-03-01 | Resolved: 2023-05-02

## 2023-05-02 PROBLEM — Z3A.28 28 WEEKS GESTATION OF PREGNANCY: Status: RESOLVED | Noted: 2023-04-27 | Resolved: 2023-05-02

## 2023-05-02 PROBLEM — O30.049 DICHORIONIC DIAMNIOTIC TWIN PREGNANCY, ANTEPARTUM: Status: RESOLVED | Noted: 2022-12-19 | Resolved: 2023-05-02

## 2023-05-02 PROBLEM — O99.320 MARIJUANA USE DURING PREGNANCY: Status: RESOLVED | Noted: 2023-02-17 | Resolved: 2023-05-02

## 2023-05-02 PROBLEM — O60.03 PRETERM LABOR IN THIRD TRIMESTER WITHOUT DELIVERY: Status: RESOLVED | Noted: 2023-04-27 | Resolved: 2023-05-02

## 2023-05-02 RX ORDER — IBUPROFEN 800 MG/1
800 TABLET ORAL EVERY 8 HOURS PRN
Qty: 60 TABLET | Refills: 2 | Status: SHIPPED | OUTPATIENT
Start: 2023-05-02 | End: 2024-05-01

## 2023-05-02 RX ORDER — FERROUS SULFATE 325(65) MG
325 TABLET ORAL
COMMUNITY
Start: 2023-04-30

## 2023-05-02 RX ORDER — HYDROCODONE BITARTRATE AND ACETAMINOPHEN 5; 325 MG/1; MG/1
TABLET ORAL
COMMUNITY
Start: 2023-04-30 | End: 2023-05-30

## 2023-05-02 RX ORDER — DOCUSATE SODIUM 100 MG/1
100 CAPSULE, LIQUID FILLED ORAL
COMMUNITY
Start: 2023-04-30

## 2023-05-02 RX ORDER — NAPROXEN 500 MG/1
500 TABLET ORAL
COMMUNITY
Start: 2023-04-30 | End: 2023-05-30

## 2023-05-02 NOTE — PROGRESS NOTES
"CC: Post-partum follow-up    Manisha Baez is a 22 y.o. female  who presents for postpartum depression check.  She is  4 days S/P  delivery of twins. Pregnancy was complicated by  labor with delivery at 28 weeks of Twin Girls. The babies are in the NICU but are making good progress. Patient is currently breastfeeding but does not have an electric pump yet. She requests Rx for breast pump so she may begin pumping. Rx completed and faxed to [email or web address]. Patient feeling reassured. Patient reports feeling helpless and overwhelmed. She has a toddler at home and babies in the NICU. She has good support at home from her partner and her mother. Patient denies thoughts of suicide or homicide. She is coping very well. Patient feeling reassured at today's visit. Patient reporting lingering pain on right side of her C/S incision, no bleeding or drainage from bandage. No swelling at incision site, no fevers, chills. Bandage is intact. Rx for  mg motrin provided.      No pain.  No fever.   No bowel / bladder complaints. Denies intercourse post delivery.     Delivery Date: 2023  Delivery MD: Dr. Tamayo  Gender: female/female  Infant Wts: A: 2#15oz; B: 2#9oz  Breast Feeding: YES  Depression: NO  Contraception: no method      Pregnancy was complicated by:  Twin pregnancy,  labor with delivery in third trimester, velamentous cord of twin B, breech presentation of both twins    /64   Pulse 83   Ht 5' 2" (1.575 m)   Wt 96.6 kg (213 lb)   Breastfeeding Unknown   BMI 38.96 kg/m²     ROS:  GENERAL: No fever, chills, fatigability.  VULVAR: No pain, no lesions and no itching.  VAGINAL: No relaxation, no itching, no discharge, no abnormal bleeding and no lesions.  ABDOMEN: No abdominal pain. Denies nausea. Denies vomiting. No diarrhea. No constipation  BREAST: Denies pain. No lumps.  URINARY: No incontinence, no nocturia, no frequency and no dysuria.  CARDIOVASCULAR: No chest pain. No " shortness of breath. No leg cramps.  NEUROLOGICAL: No headaches. No vision changes.    PHYSICAL EXAM:  Exam chaperoned by nurse  ABDOMEN:  Soft, non-tender, non-distended  VULVA:  Normal, no lesions  CERVIX:  Without lesions, polyps or tenderness.  UTERUS:  Normal size, shape, consistency, no mass or tenderness.  ADNEXA:  Normal in size without mass or tenderness    IMP:  Doing well S/P   Instructions / precautions reviewed  Contraceptive counseling  Incision check on 2023 with Dr. Tamayo    Rx Motrin, Breast Pump    PLAN:  May NOT resume normal activities  Return: 3 days

## 2023-05-05 ENCOUNTER — TELEPHONE (OUTPATIENT)
Dept: OBSTETRICS AND GYNECOLOGY | Facility: CLINIC | Age: 23
End: 2023-05-05

## 2023-05-05 ENCOUNTER — OFFICE VISIT (OUTPATIENT)
Dept: OBSTETRICS AND GYNECOLOGY | Facility: CLINIC | Age: 23
End: 2023-05-05
Payer: OTHER GOVERNMENT

## 2023-05-05 VITALS
DIASTOLIC BLOOD PRESSURE: 66 MMHG | WEIGHT: 214 LBS | SYSTOLIC BLOOD PRESSURE: 103 MMHG | HEIGHT: 62 IN | BODY MASS INDEX: 39.38 KG/M2 | HEART RATE: 81 BPM

## 2023-05-05 DIAGNOSIS — Z48.89 ENCOUNTER FOR POSTOPERATIVE WOUND CHECK: Primary | ICD-10-CM

## 2023-05-05 DIAGNOSIS — Z98.891 S/P CESAREAN SECTION: ICD-10-CM

## 2023-05-05 PROCEDURE — 99024 POSTOP FOLLOW-UP VISIT: CPT | Mod: S$GLB,,, | Performed by: STUDENT IN AN ORGANIZED HEALTH CARE EDUCATION/TRAINING PROGRAM

## 2023-05-05 PROCEDURE — 99024 PR POST-OP FOLLOW-UP VISIT: ICD-10-PCS | Mod: S$GLB,,, | Performed by: STUDENT IN AN ORGANIZED HEALTH CARE EDUCATION/TRAINING PROGRAM

## 2023-05-05 NOTE — TELEPHONE ENCOUNTER
Attempted to call patient regarding appt. Needs to come in this morning. Anytime between now and noon. Left message.

## 2023-05-05 NOTE — PROGRESS NOTES
"  Chief Complaint   Patient presents with    Wound Check        History of Present Illness:  Patient presents today  1 weeks postop, status post , with complaint of none. Pain is controlled with current analgesics. Medications being used: acetaminophen, prescription NSAID's including ibuprofen (Motrin), and narcotic analgesics including hydrocodone/acetaminophen (Lorcet, Lortab, Norco, Vicodin).  She is breast pumping for neonates who remains in NICU due to prematurity. Tolerating diet, ambulating, voiding without difficulty, reports flatus and BMs.  Bleeding is minimal.  No depressive thoughts or actions. Good family support system.    Pathology: Discussion of test results with performing physician    Past medical and surgical history reviewed.   I have reviewed the patient's medical history in detail and updated the computerized patient record.    Physical exam:  /66   Pulse 81   Ht 5' 2" (1.575 m)   Wt 97.1 kg (214 lb)   Breastfeeding Yes   BMI 39.14 kg/m²   General:  Well-developed, well-nourished female in no acute distress  HEENT:  Normocephalic, atraumatic, extraocular muscles intact, moist mucous membranes  Breasts: deferred  Abdominal:  Soft, nontender, nondistended, incision clean dry and intact.   Extremities:  Warm, dry, no clubbing/cyanosis/edema  Neurological:  For cranial nerves 2-12 grossly intact   Dermatologic:  No rashes/lesions/bruising  Psychiatric:  Appropriate mood, affect, speech    Assessment:  Status post PLTCS 2/2  labor, breech presentation of Di-Di twins    Plan:  - Doing well. Routine postop care. Pelvic rest x6 weeks, no heavy lifting x6 weeks  - continue p.o. iron supplementation for anemia  - Rtc in 4 weeks for postpartum visit or prn  "

## 2023-05-17 ENCOUNTER — OFFICE VISIT (OUTPATIENT)
Dept: OBSTETRICS AND GYNECOLOGY | Facility: CLINIC | Age: 23
End: 2023-05-17
Payer: OTHER GOVERNMENT

## 2023-05-17 VITALS
SYSTOLIC BLOOD PRESSURE: 128 MMHG | DIASTOLIC BLOOD PRESSURE: 58 MMHG | HEART RATE: 89 BPM | BODY MASS INDEX: 40 KG/M2 | HEIGHT: 62 IN | WEIGHT: 217.38 LBS

## 2023-05-17 DIAGNOSIS — R39.9 UTI SYMPTOMS: Primary | ICD-10-CM

## 2023-05-17 LAB
BILIRUBIN, UA POC OHS: NEGATIVE
BLOOD, UA POC OHS: ABNORMAL
CLARITY, UA POC OHS: CLEAR
COLOR, UA POC OHS: YELLOW
GLUCOSE, UA POC OHS: NEGATIVE
KETONES, UA POC OHS: NEGATIVE
LEUKOCYTES, UA POC OHS: ABNORMAL
NITRITE, UA POC OHS: NEGATIVE
PH, UA POC OHS: 9
PROTEIN, UA POC OHS: 30
SPECIFIC GRAVITY, UA POC OHS: 1.01
UROBILINOGEN, UA POC OHS: 0.2

## 2023-05-17 PROCEDURE — 81003 URINALYSIS AUTO W/O SCOPE: CPT | Mod: QW,S$GLB,,

## 2023-05-17 PROCEDURE — 99213 OFFICE O/P EST LOW 20 MIN: CPT | Mod: 24,S$GLB,,

## 2023-05-17 PROCEDURE — 87086 URINE CULTURE/COLONY COUNT: CPT

## 2023-05-17 PROCEDURE — 99213 PR OFFICE/OUTPT VISIT, EST, LEVL III, 20-29 MIN: ICD-10-PCS | Mod: 24,S$GLB,,

## 2023-05-17 PROCEDURE — 81003 POCT URINALYSIS(INSTRUMENT): ICD-10-PCS | Mod: QW,S$GLB,,

## 2023-05-17 NOTE — PROGRESS NOTES
HISTORY OF PRESENT ILLNESS:    Manisha Baez is a 22 y.o. female, , No LMP recorded.,  presents for a problem visit, complaining of dysuria and suprapubic pressure x 2 days. Denies fevers, chills, flank pain. U/A noted trace leukocytes and blood. Patient is 2 weeks postpartum following  delivery. Blood considered normal finding. I reviewed U/A with patient we discussed recommendation for urine culture. Patient desires. I offered patient to treat UTI prophylactically or to wait until culture results are available. Patient desires to wait for urine culture results. I advised patient she may take Azo in the meantime for UTI symptom management. Patient verbalizes understanding.     LTCS incision healing well. Seroma noted midline under incision. Patient denies, pain, bleeding, discharge at incision site.     History reviewed. No pertinent past medical history.    Past Surgical History:   Procedure Laterality Date    CHOLECYSTECTOMY  2021    PANCREAS SURGERY      stint placement and removal    TONSILLECTOMY      WISDOM TOOTH EXTRACTION Bilateral        MEDICATIONS AND ALLERGIES:      Current Outpatient Medications:     docusate sodium (COLACE) 100 MG capsule, 100 mg., Disp: , Rfl:     ferrous sulfate 325 (65 FE) MG EC tablet, Take 1 tablet (325 mg total) by mouth once daily., Disp: 30 tablet, Rfl: 11    ibuprofen (ADVIL,MOTRIN) 800 MG tablet, Take 1 tablet (800 mg total) by mouth every 8 (eight) hours as needed for Pain., Disp: 60 tablet, Rfl: 2    prenatal vit,calc76-iron-folic 29 mg iron- 1 mg Tab, Take 1 tablet by mouth every morning., Disp: , Rfl:     cyclobenzaprine (FLEXERIL) 10 MG tablet, Take 1 tablet (10 mg total) by mouth 3 (three) times daily as needed for Muscle spasms. (Patient not taking: Reported on 2023), Disp: 30 tablet, Rfl: 2    famotidine (PEPCID) 20 MG tablet, Take 1 tablet by mouth every evening., Disp: , Rfl:     ferrous sulfate (FEOSOL) 325 mg (65 mg iron) Tab tablet, 325  "mg., Disp: , Rfl:     HYDROcodone-acetaminophen (NORCO) 5-325 mg per tablet,  1 tab, Oral, q6h, PRN pain, moderate (4-6), # 15 tab, 0 Refill(s), Disp: , Rfl:     naproxen (NAPROSYN) 500 MG tablet, 500 mg., Disp: , Rfl:     ondansetron (ZOFRAN-ODT) 4 MG TbDL, Take 4 mg by mouth every 8 (eight) hours as needed., Disp: , Rfl:     prenatal vit/iron fum/folic ac (PRENATAL 1+1 ORAL), Take by mouth., Disp: , Rfl:     Review of patient's allergies indicates:   Allergen Reactions    Fluoxetine Other (See Comments)     Other reaction(s): Other (See Comments)  SUICIDAL  Other reaction(s): made depression much worse, hospitalized  suicidal      Metoclopramide hcl        History reviewed. No pertinent family history.    Social History     Socioeconomic History    Marital status:    Tobacco Use    Smoking status: Never     Passive exposure: Never    Smokeless tobacco: Never   Substance and Sexual Activity    Alcohol use: Not Currently    Drug use: Never    Sexual activity: Yes     Partners: Male     Birth control/protection: None       ROS:  GENERAL: No weight changes. No swelling. No fatigue. No fever.  CARDIOVASCULAR: No chest pain. No shortness of breath. No leg cramps.   NEUROLOGICAL: No headaches. No vision changes.  BREASTS: No pain. No lumps. No discharge.  ABDOMEN: No pain. No nausea. No vomiting. No diarrhea. No constipation.  REPRODUCTIVE: No abnormal bleeding.   VULVA: No pain. No lesions. No itching.  VAGINA: No relaxation. No itching. No odor. No discharge. No lesions.  URINARY: No incontinence. No nocturia. No frequency. + dysuria. + suprapubic pain    BP (!) 128/58   Pulse 89   Ht 5' 2" (1.575 m)   Wt 98.6 kg (217 lb 6.4 oz)   BMI 39.76 kg/m²     PE:  APPEARANCE: Well nourished, well developed, in no acute distress.  ABDOMEN: Soft. No tenderness or masses. No hepatosplenomegaly. No hernias. Incision healing well, seroma noted under then skin approx 1x1 cm  BREASTS, FUNDOSCOPIC, RECTAL DEFERRED  PELVIC: " External female genitalia without lesions.  Female hair distribution. Adequate perineal body, Normal urethral meatus. Vagina moist and well rugated without lesions or discharge.  No significant cystocele or rectocele present. Cervix pink without lesions, discharge or tenderness. Uterus is normal size, regular, mobile and nontender. Adnexa without masses or tenderness.  EXTREMITIES: No edema      DIAGNOSIS & PLAN  1. UTI symptoms  POCT Urinalysis(Instrument)    Urine culture          Follow up 4-5 weeks for postpartum exam.

## 2023-05-19 LAB
BACTERIA UR CULT: NORMAL
BACTERIA UR CULT: NORMAL

## 2023-06-02 ENCOUNTER — POSTPARTUM VISIT (OUTPATIENT)
Dept: OBSTETRICS AND GYNECOLOGY | Facility: CLINIC | Age: 23
End: 2023-06-02
Payer: OTHER GOVERNMENT

## 2023-06-02 VITALS
BODY MASS INDEX: 40.52 KG/M2 | HEIGHT: 62 IN | DIASTOLIC BLOOD PRESSURE: 62 MMHG | WEIGHT: 220.19 LBS | SYSTOLIC BLOOD PRESSURE: 110 MMHG

## 2023-06-02 NOTE — PROGRESS NOTES
CC: Post-partum follow-up    Manisha Baez is a 22 y.o. female  who presents for post-partum visit.  She is S/P  section.  Patient without complaints.  Pain controlled.  Tolerating p.o.  Pain controlled. Healing well. Positive ambulation. Positive flatus.  Bleeding is controlled.  No depression.  No abuse.    Delivery Date: 23  Delivery MD: Roni ASTUDILLO  Gender: female, female  Breast Feeding: yes  Depression: No  Contraception: no method  Facility: [facility]    Pregnancy was complicated by:  Twins with  delviery      Current Outpatient Medications:     prenatal vit,calc76-iron-folic 29 mg iron- 1 mg Tab, Take 1 tablet by mouth every morning., Disp: , Rfl:     prenatal vit/iron fum/folic ac (PRENATAL 1+1 ORAL), Take by mouth., Disp: , Rfl:     docusate sodium (COLACE) 100 MG capsule, 100 mg., Disp: , Rfl:     famotidine (PEPCID) 20 MG tablet, Take 1 tablet by mouth every evening., Disp: , Rfl:     ferrous sulfate (FEOSOL) 325 mg (65 mg iron) Tab tablet, 325 mg., Disp: , Rfl:     ferrous sulfate 325 (65 FE) MG EC tablet, Take 1 tablet (325 mg total) by mouth once daily. (Patient not taking: Reported on 2023), Disp: 30 tablet, Rfl: 11    ibuprofen (ADVIL,MOTRIN) 800 MG tablet, Take 1 tablet (800 mg total) by mouth every 8 (eight) hours as needed for Pain. (Patient not taking: Reported on 2023), Disp: 60 tablet, Rfl: 2    ondansetron (ZOFRAN-ODT) 4 MG TbDL, Take 4 mg by mouth every 8 (eight) hours as needed., Disp: , Rfl:      ROS:  GENERAL: No fever, chills, fatigability.  VULVAR: No pain, no lesions and no itching.  VAGINAL: No relaxation, no itching, no discharge, no abnormal bleeding and no lesions.  ABDOMEN: No abdominal pain. Denies nausea. Denies vomiting. No diarrhea. No constipation  BREAST: Denies pain. No lumps.  URINARY: No incontinence, no nocturia, no frequency and no dysuria.  CARDIOVASCULAR: No chest pain. No shortness of breath. No leg  "cramps.  NEUROLOGICAL: No headaches. No vision changes.      PHYSICAL EXAM:  /62   Ht 5' 2" (1.575 m)   Wt 99.9 kg (220 lb 3.2 oz)   Breastfeeding Yes   BMI 40.28 kg/m²    Exam chaperoned by nurse  General:  Well-developed, well-nourished female in no acute distress  HEENT:  Normocephalic, atraumatic, extraocular muscles intact  Breasts:  Nontender, no masses, bilaterally symmetric  Abdomen:  Soft, nontender, nondistended, fundus firm and below umbilicus, incision c/d/i  Extremities:  Warm, dry, no clubbing/cyanosis/edema  Neurologic:  Cranial nerves 2-12 grossly intact  Dermatologic:  No rashes/lesions/bruising    IMP:  Status post  section    PLAN:  Doing well.  Depression precautions discussed.  Method of contraception discussed and patient has decided.  Pregnancy spacing discussed.  Patient expressed understanding.  Return for annual exam.    "

## 2023-06-19 ENCOUNTER — OFFICE VISIT (OUTPATIENT)
Dept: OBSTETRICS AND GYNECOLOGY | Facility: CLINIC | Age: 23
End: 2023-06-19
Payer: OTHER GOVERNMENT

## 2023-06-19 VITALS
HEIGHT: 62 IN | RESPIRATION RATE: 20 BRPM | WEIGHT: 222.19 LBS | HEART RATE: 79 BPM | DIASTOLIC BLOOD PRESSURE: 68 MMHG | SYSTOLIC BLOOD PRESSURE: 116 MMHG | BODY MASS INDEX: 40.89 KG/M2

## 2023-06-19 PROCEDURE — 99213 PR OFFICE/OUTPT VISIT, EST, LEVL III, 20-29 MIN: ICD-10-PCS | Mod: S$GLB,,,

## 2023-06-19 PROCEDURE — 99213 OFFICE O/P EST LOW 20 MIN: CPT | Mod: S$GLB,,,

## 2023-06-19 RX ORDER — METOCLOPRAMIDE 10 MG/1
10 TABLET ORAL 3 TIMES DAILY
Qty: 90 TABLET | Refills: 0 | Status: SHIPPED | OUTPATIENT
Start: 2023-06-19 | End: 2023-06-20 | Stop reason: SDUPTHER

## 2023-06-19 NOTE — PROGRESS NOTES
HISTORY OF PRESENT ILLNESS:    Manisha Baez is a 22 y.o. female, , Patient's last menstrual period was 2023 (exact date).,  presents for a problem visit, complaining of low milk supply. Patient gave birth to twins on 2023. Twins recently came home from the NICU this week. Patient is pumping every 2-3 hours and gets approx 2.5 oz total after pumping. Patient states she empties her breasts completely. Baby A is latching well and getting approx 1.5 oz from each breast during feeding. Patient is concerned because babies are still hungry and she desires to exclusively breast feed. She is currently supplementing with formula as well.     We discussed different ways to increase milk supply. Patient is doing well by pumping on strict schedule. Latching with infant will definitely help her. We also dicussed patient getting good sleep and maintaining a well balanced nutritious diet. Patient receives help at home with cooking from her mother and is eating a good diet.     Patient has tried OTC lactation supplements and foods. She does not desire to try fenugreek as she feels this tanked her milk supply with her last child.   Galactoagues reviewed and discussed. We reviewed findings from studies in which Reglan is used as milk supply discussed she will need to take 10mg TID for at least 3 weeks to see increase in milk supply. Discussed domiperidone is another galactogogue used however that is not approved in the [place] Patient would like to try reglan to increase milk supply.     Patient instructed to follow up in 3 weeks.     History reviewed. No pertinent past medical history.    Past Surgical History:   Procedure Laterality Date     SECTION  2023    [facility]    CHOLECYSTECTOMY  2021    PANCREAS SURGERY      stint placement and removal    TONSILLECTOMY      WISDOM TOOTH EXTRACTION Bilateral        MEDICATIONS AND ALLERGIES:      Current Outpatient Medications:     ibuprofen (ADVIL,MOTRIN) 800  MG tablet, Take 1 tablet (800 mg total) by mouth every 8 (eight) hours as needed for Pain., Disp: 60 tablet, Rfl: 2    prenatal vit,calc76-iron-folic 29 mg iron- 1 mg Tab, Take 1 tablet by mouth every morning., Disp: , Rfl:     prenatal vit/iron fum/folic ac (PRENATAL 1+1 ORAL), Take by mouth., Disp: , Rfl:     docusate sodium (COLACE) 100 MG capsule, 100 mg., Disp: , Rfl:     famotidine (PEPCID) 20 MG tablet, Take 1 tablet by mouth every evening., Disp: , Rfl:     ferrous sulfate (FEOSOL) 325 mg (65 mg iron) Tab tablet, 325 mg., Disp: , Rfl:     ferrous sulfate 325 (65 FE) MG EC tablet, Take 1 tablet (325 mg total) by mouth once daily. (Patient not taking: Reported on 6/2/2023), Disp: 30 tablet, Rfl: 11    metoclopramide HCl (REGLAN) 10 MG tablet, Take 1 tablet (10 mg total) by mouth 3 (three) times daily., Disp: 90 tablet, Rfl: 0    ondansetron (ZOFRAN-ODT) 4 MG TbDL, Take 4 mg by mouth every 8 (eight) hours as needed., Disp: , Rfl:     Review of patient's allergies indicates:   Allergen Reactions    Fluoxetine Other (See Comments)     Other reaction(s): Other (See Comments)  SUICIDAL  Other reaction(s): made depression much worse, hospitalized  suicidal         History reviewed. No pertinent family history.    Social History     Socioeconomic History    Marital status:    Tobacco Use    Smoking status: Never     Passive exposure: Never    Smokeless tobacco: Never   Substance and Sexual Activity    Alcohol use: Not Currently    Drug use: Never    Sexual activity: Yes     Partners: Male     Birth control/protection: None       ROS:  GENERAL: No weight changes. No swelling. No fatigue. No fever.  CARDIOVASCULAR: No chest pain. No shortness of breath. No leg cramps.   NEUROLOGICAL: No headaches. No vision changes.  BREASTS: No pain. No lumps. No discharge.  ABDOMEN: No pain. No nausea. No vomiting. No diarrhea. No constipation.  REPRODUCTIVE: No abnormal bleeding.   VULVA: No pain. No lesions. No  "itching.  VAGINA: No relaxation. No itching. No odor. No discharge. No lesions.  URINARY: No incontinence. No nocturia. No frequency. No dysuria.    /68 (BP Location: Right arm, Patient Position: Sitting, BP Method: Large (Automatic))   Pulse 79   Resp 20   Ht 5' 2" (1.575 m)   Wt 100.8 kg (222 lb 3.2 oz)   LMP 06/14/2023 (Exact Date)   Breastfeeding Yes   BMI 40.64 kg/m²     PE:  APPEARANCE: Well nourished, well developed, in no acute distress.  ABDOMEN: Soft. No tenderness or masses. No hepatosplenomegaly. No hernias.  BREASTS, FUNDOSCOPIC, RECTAL DEFERRED  PELVIC: External female genitalia without lesions.  Female hair distribution. Adequate perineal body, Normal urethral meatus. Vagina moist and well rugated without lesions or discharge.  No significant cystocele or rectocele present. Cervix pink without lesions, discharge or tenderness. Uterus is normal size, regular, mobile and nontender. Adnexa without masses or tenderness.  EXTREMITIES: No edema      DIAGNOSIS & PLAN  1. Difficulty of mother performing breastfeeding  metoclopramide HCl (REGLAN) 10 MG tablet          Follow up 3 weeks.     "

## 2023-06-20 ENCOUNTER — PATIENT MESSAGE (OUTPATIENT)
Dept: OBSTETRICS AND GYNECOLOGY | Facility: CLINIC | Age: 23
End: 2023-06-20
Payer: OTHER GOVERNMENT

## 2023-06-20 RX ORDER — METOCLOPRAMIDE 10 MG/1
10 TABLET ORAL 3 TIMES DAILY
Qty: 90 TABLET | Refills: 0 | Status: SHIPPED | OUTPATIENT
Start: 2023-06-20 | End: 2023-07-20